# Patient Record
Sex: MALE | Race: WHITE | URBAN - METROPOLITAN AREA
[De-identification: names, ages, dates, MRNs, and addresses within clinical notes are randomized per-mention and may not be internally consistent; named-entity substitution may affect disease eponyms.]

---

## 2017-05-17 ENCOUNTER — OFFICE VISIT (OUTPATIENT)
Dept: FAMILY MEDICINE CLINIC | Age: 62
End: 2017-05-17

## 2017-05-17 VITALS
TEMPERATURE: 98.1 F | HEART RATE: 65 BPM | DIASTOLIC BLOOD PRESSURE: 88 MMHG | OXYGEN SATURATION: 95 % | RESPIRATION RATE: 18 BRPM | BODY MASS INDEX: 38.57 KG/M2 | WEIGHT: 291 LBS | SYSTOLIC BLOOD PRESSURE: 140 MMHG | HEIGHT: 73 IN

## 2017-05-17 DIAGNOSIS — Z12.5 SCREENING FOR PROSTATE CANCER: ICD-10-CM

## 2017-05-17 DIAGNOSIS — E78.2 MIXED HYPERLIPIDEMIA: ICD-10-CM

## 2017-05-17 DIAGNOSIS — Z11.59 NEED FOR HEPATITIS C SCREENING TEST: ICD-10-CM

## 2017-05-17 DIAGNOSIS — Z00.00 PHYSICAL EXAM: Primary | ICD-10-CM

## 2017-05-17 DIAGNOSIS — E03.9 ACQUIRED HYPOTHYROIDISM: ICD-10-CM

## 2017-05-17 DIAGNOSIS — I10 ESSENTIAL HYPERTENSION: ICD-10-CM

## 2017-05-17 PROCEDURE — 99396 PREV VISIT EST AGE 40-64: CPT | Performed by: NURSE PRACTITIONER

## 2017-05-17 RX ORDER — HYDROCHLOROTHIAZIDE 12.5 MG/1
12.5 TABLET ORAL DAILY
Qty: 30 TABLET | Refills: 0 | Status: SHIPPED | OUTPATIENT
Start: 2017-05-17 | End: 2017-06-24 | Stop reason: SDUPTHER

## 2017-05-17 ASSESSMENT — ENCOUNTER SYMPTOMS
RESPIRATORY NEGATIVE: 1
EYES NEGATIVE: 1
GASTROINTESTINAL NEGATIVE: 1

## 2017-05-17 ASSESSMENT — PATIENT HEALTH QUESTIONNAIRE - PHQ9
SUM OF ALL RESPONSES TO PHQ9 QUESTIONS 1 & 2: 0
2. FEELING DOWN, DEPRESSED OR HOPELESS: 0
1. LITTLE INTEREST OR PLEASURE IN DOING THINGS: 0
SUM OF ALL RESPONSES TO PHQ QUESTIONS 1-9: 0

## 2017-05-28 DIAGNOSIS — M15.9 PRIMARY OSTEOARTHRITIS INVOLVING MULTIPLE JOINTS: Primary | ICD-10-CM

## 2017-05-28 RX ORDER — MELOXICAM 15 MG/1
TABLET ORAL
Qty: 30 TABLET | Refills: 11 | OUTPATIENT
Start: 2017-05-28

## 2017-06-01 RX ORDER — MELOXICAM 15 MG/1
15 TABLET ORAL DAILY
Qty: 30 TABLET | Refills: 11 | Status: SHIPPED | OUTPATIENT
Start: 2017-06-01

## 2017-08-21 RX ORDER — HYDROCHLOROTHIAZIDE 12.5 MG/1
12.5 CAPSULE, GELATIN COATED ORAL EVERY MORNING
Qty: 90 CAPSULE | Refills: 3 | Status: SHIPPED | OUTPATIENT
Start: 2017-08-21

## 2017-09-01 DIAGNOSIS — E78.2 MIXED HYPERLIPIDEMIA: ICD-10-CM

## 2017-09-01 DIAGNOSIS — Z12.5 SCREENING FOR PROSTATE CANCER: ICD-10-CM

## 2017-09-01 DIAGNOSIS — E03.9 ACQUIRED HYPOTHYROIDISM: ICD-10-CM

## 2017-09-01 DIAGNOSIS — Z11.59 NEED FOR HEPATITIS C SCREENING TEST: ICD-10-CM

## 2017-09-01 DIAGNOSIS — I10 ESSENTIAL HYPERTENSION: ICD-10-CM

## 2017-09-01 LAB
ALBUMIN SERPL-MCNC: 3.9 G/DL (ref 3.9–4.9)
ALP BLD-CCNC: 65 U/L (ref 35–104)
ALT SERPL-CCNC: 22 U/L (ref 0–41)
ANION GAP SERPL CALCULATED.3IONS-SCNC: 14 MEQ/L (ref 7–13)
AST SERPL-CCNC: 22 U/L (ref 0–40)
BASOPHILS ABSOLUTE: 0.1 K/UL (ref 0–0.2)
BASOPHILS RELATIVE PERCENT: 1 %
BILIRUB SERPL-MCNC: 0.6 MG/DL (ref 0–1.2)
BUN BLDV-MCNC: 23 MG/DL (ref 8–23)
CALCIUM SERPL-MCNC: 8.9 MG/DL (ref 8.6–10.2)
CHLORIDE BLD-SCNC: 101 MEQ/L (ref 98–107)
CHOLESTEROL, TOTAL: 228 MG/DL (ref 0–199)
CO2: 26 MEQ/L (ref 22–29)
CREAT SERPL-MCNC: 0.94 MG/DL (ref 0.7–1.2)
EOSINOPHILS ABSOLUTE: 0.3 K/UL (ref 0–0.7)
EOSINOPHILS RELATIVE PERCENT: 5.9 %
GFR AFRICAN AMERICAN: >60
GFR NON-AFRICAN AMERICAN: >60
GLOBULIN: 2.5 G/DL (ref 2.3–3.5)
GLUCOSE BLD-MCNC: 99 MG/DL (ref 74–109)
HCT VFR BLD CALC: 44 % (ref 42–52)
HDLC SERPL-MCNC: 53 MG/DL (ref 40–59)
HEMOGLOBIN: 14.7 G/DL (ref 14–18)
HEPATITIS C ANTIBODY INTERPRETATION: NORMAL
LDL CHOLESTEROL CALCULATED: 150 MG/DL (ref 0–129)
LYMPHOCYTES ABSOLUTE: 1.3 K/UL (ref 1–4.8)
LYMPHOCYTES RELATIVE PERCENT: 25.1 %
MCH RBC QN AUTO: 30.4 PG (ref 27–31.3)
MCHC RBC AUTO-ENTMCNC: 33.3 % (ref 33–37)
MCV RBC AUTO: 91.3 FL (ref 80–100)
MONOCYTES ABSOLUTE: 0.5 K/UL (ref 0.2–0.8)
MONOCYTES RELATIVE PERCENT: 8.7 %
NEUTROPHILS ABSOLUTE: 3.1 K/UL (ref 1.4–6.5)
NEUTROPHILS RELATIVE PERCENT: 59.3 %
PDW BLD-RTO: 13.4 % (ref 11.5–14.5)
PLATELET # BLD: 209 K/UL (ref 130–400)
POTASSIUM SERPL-SCNC: 4.9 MEQ/L (ref 3.5–5.1)
PROSTATE SPECIFIC ANTIGEN: 3.6 NG/ML (ref 0–5.4)
RBC # BLD: 4.82 M/UL (ref 4.7–6.1)
SODIUM BLD-SCNC: 141 MEQ/L (ref 132–144)
TOTAL PROTEIN: 6.4 G/DL (ref 6.4–8.1)
TRIGL SERPL-MCNC: 123 MG/DL (ref 0–200)
TSH REFLEX: 2.94 UIU/ML (ref 0.27–4.2)
WBC # BLD: 5.2 K/UL (ref 4.8–10.8)

## 2018-04-07 DIAGNOSIS — E03.9 ACQUIRED HYPOTHYROIDISM: Primary | ICD-10-CM

## 2018-04-07 DIAGNOSIS — E03.9 ACQUIRED HYPOTHYROIDISM: ICD-10-CM

## 2018-04-07 LAB — TSH SERPL DL<=0.05 MIU/L-ACNC: 3.58 UIU/ML (ref 0.27–4.2)

## 2018-04-09 RX ORDER — LEVOTHYROXINE SODIUM 0.05 MG/1
TABLET ORAL
Qty: 90 TABLET | Refills: 3 | Status: SHIPPED | OUTPATIENT
Start: 2018-04-09

## 2023-01-29 PROBLEM — J34.89 NASAL CONGESTION WITH RHINORRHEA: Status: ACTIVE | Noted: 2023-01-29

## 2023-01-29 PROBLEM — J00 NASOPHARYNGITIS: Status: ACTIVE | Noted: 2023-01-29

## 2023-01-29 PROBLEM — E66.812 CLASS 2 SEVERE OBESITY WITH SERIOUS COMORBIDITY AND BODY MASS INDEX (BMI) OF 38.0 TO 38.9 IN ADULT: Status: ACTIVE | Noted: 2023-01-29

## 2023-01-29 PROBLEM — R09.81 NASAL CONGESTION WITH RHINORRHEA: Status: ACTIVE | Noted: 2023-01-29

## 2023-01-29 PROBLEM — R97.20 ELEVATED PSA: Status: ACTIVE | Noted: 2023-01-29

## 2023-01-29 PROBLEM — E03.9 ACQUIRED HYPOTHYROIDISM: Status: ACTIVE | Noted: 2023-01-29

## 2023-01-29 PROBLEM — N13.9 OBSTRUCTIVE UROPATHY: Status: ACTIVE | Noted: 2023-01-29

## 2023-01-29 PROBLEM — H10.32 ACUTE BACTERIAL CONJUNCTIVITIS OF LEFT EYE: Status: ACTIVE | Noted: 2023-01-29

## 2023-01-29 PROBLEM — N40.2 PROSTATE NODULE: Status: ACTIVE | Noted: 2023-01-29

## 2023-01-29 PROBLEM — E66.01 CLASS 2 SEVERE OBESITY WITH SERIOUS COMORBIDITY AND BODY MASS INDEX (BMI) OF 38.0 TO 38.9 IN ADULT (MULTI): Status: ACTIVE | Noted: 2023-01-29

## 2023-01-29 PROBLEM — H57.89 REDNESS AND DISCHARGE OF EYE: Status: ACTIVE | Noted: 2023-01-29

## 2023-01-29 PROBLEM — H69.90 EUSTACHIAN TUBE DYSFUNCTION: Status: RESOLVED | Noted: 2023-01-29 | Resolved: 2023-01-29

## 2023-01-29 PROBLEM — R05.9 COUGH: Status: ACTIVE | Noted: 2023-01-29

## 2023-01-29 PROBLEM — E78.00 HYPERCHOLESTEREMIA: Status: ACTIVE | Noted: 2023-01-29

## 2023-01-29 RX ORDER — CEFDINIR 300 MG/1
300 CAPSULE ORAL EVERY 12 HOURS
COMMUNITY
End: 2023-03-13 | Stop reason: ALTCHOICE

## 2023-01-29 RX ORDER — TAMSULOSIN HYDROCHLORIDE 0.4 MG/1
0.4 CAPSULE ORAL DAILY
COMMUNITY

## 2023-01-29 RX ORDER — ATORVASTATIN CALCIUM 20 MG/1
20 TABLET, FILM COATED ORAL DAILY
COMMUNITY
Start: 2020-02-20 | End: 2023-09-05

## 2023-01-29 RX ORDER — TOBRAMYCIN 3 MG/ML
1 SOLUTION/ DROPS OPHTHALMIC 4 TIMES DAILY
COMMUNITY
End: 2023-03-13 | Stop reason: WASHOUT

## 2023-01-29 RX ORDER — LEVOTHYROXINE SODIUM 50 UG/1
50 TABLET ORAL DAILY
COMMUNITY
Start: 2019-07-15 | End: 2023-05-01 | Stop reason: SDUPTHER

## 2023-03-07 ENCOUNTER — LAB (OUTPATIENT)
Dept: LAB | Facility: LAB | Age: 68
End: 2023-03-07
Payer: MEDICARE

## 2023-03-07 DIAGNOSIS — R97.20 ELEVATED PSA: ICD-10-CM

## 2023-03-07 DIAGNOSIS — E03.9 ACQUIRED HYPOTHYROIDISM: ICD-10-CM

## 2023-03-07 DIAGNOSIS — E78.00 HYPERCHOLESTEREMIA: ICD-10-CM

## 2023-03-07 LAB
ALANINE AMINOTRANSFERASE (SGPT) (U/L) IN SER/PLAS: 21 U/L (ref 10–52)
ALBUMIN (G/DL) IN SER/PLAS: 3.9 G/DL (ref 3.4–5)
ALKALINE PHOSPHATASE (U/L) IN SER/PLAS: 61 U/L (ref 33–136)
ANION GAP IN SER/PLAS: 12 MMOL/L (ref 10–20)
ASPARTATE AMINOTRANSFERASE (SGOT) (U/L) IN SER/PLAS: 24 U/L (ref 9–39)
BASOPHILS (10*3/UL) IN BLOOD BY AUTOMATED COUNT: 0.07 X10E9/L (ref 0–0.1)
BASOPHILS/100 LEUKOCYTES IN BLOOD BY AUTOMATED COUNT: 1.9 % (ref 0–2)
BILIRUBIN TOTAL (MG/DL) IN SER/PLAS: 1 MG/DL (ref 0–1.2)
CALCIUM (MG/DL) IN SER/PLAS: 8.9 MG/DL (ref 8.6–10.3)
CARBON DIOXIDE, TOTAL (MMOL/L) IN SER/PLAS: 29 MMOL/L (ref 21–32)
CHLORIDE (MMOL/L) IN SER/PLAS: 101 MMOL/L (ref 98–107)
CHOLESTEROL (MG/DL) IN SER/PLAS: 198 MG/DL (ref 0–199)
CHOLESTEROL IN HDL (MG/DL) IN SER/PLAS: 66.1 MG/DL
CHOLESTEROL/HDL RATIO: 3
CREATININE (MG/DL) IN SER/PLAS: 1.05 MG/DL (ref 0.5–1.3)
EOSINOPHILS (10*3/UL) IN BLOOD BY AUTOMATED COUNT: 0.2 X10E9/L (ref 0–0.7)
EOSINOPHILS/100 LEUKOCYTES IN BLOOD BY AUTOMATED COUNT: 5.4 % (ref 0–6)
ERYTHROCYTE DISTRIBUTION WIDTH (RATIO) BY AUTOMATED COUNT: 13.2 % (ref 11.5–14.5)
ERYTHROCYTE MEAN CORPUSCULAR HEMOGLOBIN CONCENTRATION (G/DL) BY AUTOMATED: 32.6 G/DL (ref 32–36)
ERYTHROCYTE MEAN CORPUSCULAR VOLUME (FL) BY AUTOMATED COUNT: 94 FL (ref 80–100)
ERYTHROCYTES (10*6/UL) IN BLOOD BY AUTOMATED COUNT: 4.86 X10E12/L (ref 4.5–5.9)
GFR MALE: 77 ML/MIN/1.73M2
GLUCOSE (MG/DL) IN SER/PLAS: 115 MG/DL (ref 74–99)
HEMATOCRIT (%) IN BLOOD BY AUTOMATED COUNT: 45.7 % (ref 41–52)
HEMOGLOBIN (G/DL) IN BLOOD: 14.9 G/DL (ref 13.5–17.5)
IMMATURE GRANULOCYTES/100 LEUKOCYTES IN BLOOD BY AUTOMATED COUNT: 0.3 % (ref 0–0.9)
LDL: 102 MG/DL (ref 0–99)
LEUKOCYTES (10*3/UL) IN BLOOD BY AUTOMATED COUNT: 3.7 X10E9/L (ref 4.4–11.3)
LYMPHOCYTES (10*3/UL) IN BLOOD BY AUTOMATED COUNT: 0.93 X10E9/L (ref 1.2–4.8)
LYMPHOCYTES/100 LEUKOCYTES IN BLOOD BY AUTOMATED COUNT: 25.3 % (ref 13–44)
MONOCYTES (10*3/UL) IN BLOOD BY AUTOMATED COUNT: 0.34 X10E9/L (ref 0.1–1)
MONOCYTES/100 LEUKOCYTES IN BLOOD BY AUTOMATED COUNT: 9.2 % (ref 2–10)
NEUTROPHILS (10*3/UL) IN BLOOD BY AUTOMATED COUNT: 2.13 X10E9/L (ref 1.2–7.7)
NEUTROPHILS/100 LEUKOCYTES IN BLOOD BY AUTOMATED COUNT: 57.9 % (ref 40–80)
PLATELETS (10*3/UL) IN BLOOD AUTOMATED COUNT: 207 X10E9/L (ref 150–450)
POTASSIUM (MMOL/L) IN SER/PLAS: 4.2 MMOL/L (ref 3.5–5.3)
PROSTATE SPECIFIC ANTIGEN,SCREEN: 6.79 NG/ML (ref 0–4)
PROTEIN TOTAL: 6.3 G/DL (ref 6.4–8.2)
SODIUM (MMOL/L) IN SER/PLAS: 138 MMOL/L (ref 136–145)
THYROTROPIN (MIU/L) IN SER/PLAS BY DETECTION LIMIT <= 0.05 MIU/L: 5.29 MIU/L (ref 0.44–3.98)
TRIGLYCERIDE (MG/DL) IN SER/PLAS: 150 MG/DL (ref 0–149)
UREA NITROGEN (MG/DL) IN SER/PLAS: 18 MG/DL (ref 6–23)
VLDL: 30 MG/DL (ref 0–40)

## 2023-03-07 PROCEDURE — 85025 COMPLETE CBC W/AUTO DIFF WBC: CPT

## 2023-03-07 PROCEDURE — 84443 ASSAY THYROID STIM HORMONE: CPT

## 2023-03-07 PROCEDURE — 80061 LIPID PANEL: CPT

## 2023-03-07 PROCEDURE — 80053 COMPREHEN METABOLIC PANEL: CPT

## 2023-03-07 PROCEDURE — 84153 ASSAY OF PSA TOTAL: CPT

## 2023-03-07 PROCEDURE — 36415 COLL VENOUS BLD VENIPUNCTURE: CPT

## 2023-03-13 ENCOUNTER — OFFICE VISIT (OUTPATIENT)
Dept: PRIMARY CARE | Facility: CLINIC | Age: 68
End: 2023-03-13
Payer: MEDICARE

## 2023-03-13 VITALS
HEART RATE: 66 BPM | SYSTOLIC BLOOD PRESSURE: 122 MMHG | HEIGHT: 74 IN | DIASTOLIC BLOOD PRESSURE: 66 MMHG | WEIGHT: 288.8 LBS | OXYGEN SATURATION: 97 % | BODY MASS INDEX: 37.06 KG/M2 | TEMPERATURE: 96.6 F | RESPIRATION RATE: 14 BRPM

## 2023-03-13 DIAGNOSIS — R97.20 ELEVATED PSA: ICD-10-CM

## 2023-03-13 DIAGNOSIS — E03.9 ACQUIRED HYPOTHYROIDISM: ICD-10-CM

## 2023-03-13 DIAGNOSIS — Z00.00 ROUTINE GENERAL MEDICAL EXAMINATION AT HEALTH CARE FACILITY: Primary | ICD-10-CM

## 2023-03-13 PROBLEM — R09.81 NASAL CONGESTION WITH RHINORRHEA: Status: RESOLVED | Noted: 2023-01-29 | Resolved: 2023-03-13

## 2023-03-13 PROBLEM — H57.89 REDNESS AND DISCHARGE OF EYE: Status: RESOLVED | Noted: 2023-01-29 | Resolved: 2023-03-13

## 2023-03-13 PROBLEM — R05.9 COUGH: Status: RESOLVED | Noted: 2023-01-29 | Resolved: 2023-03-13

## 2023-03-13 PROBLEM — J00 NASOPHARYNGITIS: Status: RESOLVED | Noted: 2023-01-29 | Resolved: 2023-03-13

## 2023-03-13 PROBLEM — J34.89 NASAL CONGESTION WITH RHINORRHEA: Status: RESOLVED | Noted: 2023-01-29 | Resolved: 2023-03-13

## 2023-03-13 PROBLEM — H10.32 ACUTE BACTERIAL CONJUNCTIVITIS OF LEFT EYE: Status: RESOLVED | Noted: 2023-01-29 | Resolved: 2023-03-13

## 2023-03-13 PROCEDURE — G0439 PPPS, SUBSEQ VISIT: HCPCS | Performed by: FAMILY MEDICINE

## 2023-03-13 PROCEDURE — 1159F MED LIST DOCD IN RCRD: CPT | Performed by: FAMILY MEDICINE

## 2023-03-13 PROCEDURE — 99214 OFFICE O/P EST MOD 30 MIN: CPT | Performed by: FAMILY MEDICINE

## 2023-03-13 PROCEDURE — 1036F TOBACCO NON-USER: CPT | Performed by: FAMILY MEDICINE

## 2023-03-13 PROCEDURE — 1170F FXNL STATUS ASSESSED: CPT | Performed by: FAMILY MEDICINE

## 2023-03-13 PROCEDURE — 1160F RVW MEDS BY RX/DR IN RCRD: CPT | Performed by: FAMILY MEDICINE

## 2023-03-13 ASSESSMENT — ENCOUNTER SYMPTOMS
OCCASIONAL FEELINGS OF UNSTEADINESS: 0
DEPRESSION: 0
LOSS OF SENSATION IN FEET: 0

## 2023-03-13 ASSESSMENT — ACTIVITIES OF DAILY LIVING (ADL)
DRESSING: INDEPENDENT
BATHING: INDEPENDENT
MANAGING_FINANCES: INDEPENDENT
TAKING_MEDICATION: INDEPENDENT
DOING_HOUSEWORK: INDEPENDENT
GROCERY_SHOPPING: INDEPENDENT

## 2023-03-13 ASSESSMENT — PATIENT HEALTH QUESTIONNAIRE - PHQ9
2. FEELING DOWN, DEPRESSED OR HOPELESS: NOT AT ALL
1. LITTLE INTEREST OR PLEASURE IN DOING THINGS: NOT AT ALL
SUM OF ALL RESPONSES TO PHQ9 QUESTIONS 1 AND 2: 0

## 2023-03-13 NOTE — PATIENT INSTRUCTIONS
Cancel May appointment.  This patient will follow-up with me in September with lab work ahead of time. Other follow-up will be as needed.

## 2023-03-13 NOTE — PROGRESS NOTES
"  Subjective   Reason for Visit: Sklyer Pena is an 67 y.o. male here for a Medicare Wellness visit.     Reviewed all medications by prescribing practitioner or clinical pharmacist (such as prescriptions, OTCs, herbal therapies and supplements) and documented in the medical record.    Patient Self Assessment of Health Status  Patient Self Assessment: GoodHyperlipidemia: Reports taking medication as advised and without side effects.  The patient is reporting no leg cramping in particular. The patient is trying to follow a low-fat diet.  Hypothyroidism: This patient is here today for review of hypothyroidism. Lab work is reviewed. The patient is taking thyroid replacement hormone without side effects.   Obstructive Uropathy: Patient states symptoms are well controlled with his medication.    Patient Care Team:  Fernando Caban MD as PCP - General  Fernando Caban MD as PCP - [employer] PCP     This patient has no chest pains or pressures. The patient has no shortness of breath at rest or with exertion. There is no indigestion, heartburn, nausea or vomiting. There is no constipation, diarrhea or stool changes.    Objective   Vitals:  /66   Pulse 66   Temp 35.9 °C (96.6 °F)   Resp 14   Ht 1.88 m (6' 2\")   Wt 131 kg (288 lb 12.8 oz)   SpO2 97%   BMI 37.08 kg/m²       Physical Exam patient is alert and oriented X 3.  Neck is supple without mass, adenopathy or bruit.  Lungs are clear with equal breath sounds.  Heart is in a regular rhythm without murmur rub or gallop.  Abdomen is soft and nontender with no masses palpable. This patient has an elevated BMI.  Low back exam significant for no tenderness in the lumbosacral spine.  Extremity exam significant for palpable pulses at the posterior tibials and radials which are strong and symmetric. There is no edema in the extremities upper or lower.    CBC, CMP, lipid panel, TSH and PSA from 3/7/2023 reviewed.  Patient recently had prostate biopsy " that was benign.    This patient will follow-up with me in September with lab work ahead of time. Other follow-up will be as needed.    Assessment/Plan   Problem List Items Addressed This Visit          Genitourinary    Elevated PSA    Current Assessment & Plan     PSA is elevated but patient recently had transrectal prostate biopsy which showed no evidence of cancer.            Endocrine/Metabolic    Acquired hypothyroidism    Current Assessment & Plan      Is poorly controlled, therapeutic changes necessary.  Patient will become more diligent about taking the current dose of levothyroxine on a regular basis.         Relevant Orders    Follow Up In [facility] - PCP       Other    Routine general medical examination at health care facility - Primary    Overview     Medicare wellness visit done, patient is in satisfactory living circumstances where the patient's needs are met.

## 2023-03-14 NOTE — ASSESSMENT & PLAN NOTE
Is poorly controlled, therapeutic changes necessary.  Patient will become more diligent about taking the current dose of levothyroxine on a regular basis.

## 2023-03-14 NOTE — ASSESSMENT & PLAN NOTE
PSA is elevated but patient recently had transrectal prostate biopsy which showed no evidence of cancer.

## 2023-03-14 NOTE — ASSESSMENT & PLAN NOTE
This patient is advised to lose weight by reducing calorie intake and increasing activity levels, especially aerobic exercise.

## 2023-04-22 ENCOUNTER — OFFICE VISIT (OUTPATIENT)
Dept: PRIMARY CARE | Facility: CLINIC | Age: 68
End: 2023-04-22
Payer: MEDICARE

## 2023-04-22 VITALS
HEART RATE: 75 BPM | TEMPERATURE: 97.3 F | SYSTOLIC BLOOD PRESSURE: 168 MMHG | HEIGHT: 74 IN | OXYGEN SATURATION: 95 % | BODY MASS INDEX: 36.7 KG/M2 | RESPIRATION RATE: 16 BRPM | DIASTOLIC BLOOD PRESSURE: 90 MMHG | WEIGHT: 286 LBS

## 2023-04-22 DIAGNOSIS — H10.31 ACUTE BACTERIAL CONJUNCTIVITIS OF RIGHT EYE: Primary | ICD-10-CM

## 2023-04-22 DIAGNOSIS — R05.8 PRODUCTIVE COUGH: ICD-10-CM

## 2023-04-22 DIAGNOSIS — E03.9 ACQUIRED HYPOTHYROIDISM: ICD-10-CM

## 2023-04-22 PROCEDURE — 1159F MED LIST DOCD IN RCRD: CPT | Performed by: NURSE PRACTITIONER

## 2023-04-22 PROCEDURE — 1160F RVW MEDS BY RX/DR IN RCRD: CPT | Performed by: NURSE PRACTITIONER

## 2023-04-22 PROCEDURE — 99213 OFFICE O/P EST LOW 20 MIN: CPT | Performed by: NURSE PRACTITIONER

## 2023-04-22 PROCEDURE — 1036F TOBACCO NON-USER: CPT | Performed by: NURSE PRACTITIONER

## 2023-04-22 RX ORDER — AZITHROMYCIN 250 MG/1
TABLET, FILM COATED ORAL
Qty: 6 TABLET | Refills: 0 | Status: SHIPPED | OUTPATIENT
Start: 2023-04-22 | End: 2023-09-11 | Stop reason: WASHOUT

## 2023-04-22 RX ORDER — POLYMYXIN B SULFATE AND TRIMETHOPRIM 1; 10000 MG/ML; [USP'U]/ML
1 SOLUTION OPHTHALMIC 4 TIMES DAILY
Qty: 10 ML | Refills: 0 | Status: SHIPPED | OUTPATIENT
Start: 2023-04-22 | End: 2023-04-29

## 2023-04-22 ASSESSMENT — PATIENT HEALTH QUESTIONNAIRE - PHQ9
SUM OF ALL RESPONSES TO PHQ9 QUESTIONS 1 AND 2: 0
1. LITTLE INTEREST OR PLEASURE IN DOING THINGS: NOT AT ALL
2. FEELING DOWN, DEPRESSED OR HOPELESS: NOT AT ALL

## 2023-04-22 ASSESSMENT — ENCOUNTER SYMPTOMS
EYE ITCHING: 1
STRIDOR: 0
EYE PAIN: 0
COUGH: 1
DEPRESSION: 0
MUSCULOSKELETAL NEGATIVE: 1
CONSTITUTIONAL NEGATIVE: 1
CARDIOVASCULAR NEGATIVE: 1
EYE REDNESS: 1
SWOLLEN GLANDS: 0
FEVER: 0
DOUBLE VISION: 0
NEUROLOGICAL NEGATIVE: 1
PSYCHIATRIC NEGATIVE: 1
EYE DISCHARGE: 1
SORE THROAT: 0
RHINORRHEA: 0
DIARRHEA: 0
SHORTNESS OF BREATH: 0
WHEEZING: 0
HEADACHES: 0
VOMITING: 0
ENDOCRINE NEGATIVE: 1
ALLERGIC/IMMUNOLOGIC NEGATIVE: 1
GASTROINTESTINAL NEGATIVE: 1
NECK PAIN: 0
OCCASIONAL FEELINGS OF UNSTEADINESS: 0
HEMATOLOGIC/LYMPHATIC NEGATIVE: 1
PHOTOPHOBIA: 0
LOSS OF SENSATION IN FEET: 0
NAUSEA: 0
CONSTIPATION: 0
CHEST TIGHTNESS: 0

## 2023-04-22 NOTE — PROGRESS NOTES
Chief Complaint: Possible conjunctivitis   Symptoms: right eye redness, cough, congestion, green phlegm, right eye drainage, and crusted over   Length of symptoms: 04/22/2023 but for cough 2+ weeks  OTC: eye drops  Related information:

## 2023-04-22 NOTE — ASSESSMENT & PLAN NOTE
Patient to take medication associated with this visit.  Patient may also take OTC analgesic/antipyretic if needed for pain/fever.  Advised to increase oral fluid intake as tolerated if no nausea or vomiting. Patient will use humidifier as discussed.

## 2023-04-22 NOTE — ASSESSMENT & PLAN NOTE
Patient to use medication associated with this visit.  Patient advised to increase frequency of washing hands. Patient can use warm compress to assist with crusting  of eye.

## 2023-04-22 NOTE — PROGRESS NOTES
Subjective   Patient ID: Skyler Pena is a 67 y.o. male who presents for Conjunctivitis.  Patient presents to office with complaint of right eye crusting, itchy, redness x 2 days ago.  Patient also reports productive cough x 2 weeks.  Patient has tried OTC eye drops with minimal relief.  Patient denies change in vision, fever, eye pain.  Patient reports OTC cough syrup and honey with minimal relief.  Patient denies shortness of breath, chest pain, nausea, vomiting nor diarrhea. Patient has been eating and drinking without difficulty.     Conjunctivitis   The current episode started today. The onset was sudden. The problem has been unchanged. Nothing relieves the symptoms. Associated symptoms include eye itching, congestion, cough, eye discharge and eye redness. Pertinent negatives include no fever, no decreased vision, no double vision, no photophobia, no constipation, no diarrhea, no nausea, no vomiting, no ear discharge, no ear pain, no headaches, no hearing loss, no mouth sores, no rhinorrhea, no sore throat, no stridor, no swollen glands, no neck pain, no neck stiffness, no wheezing, no rash and no eye pain. The right eye is affected. He has been Eating and drinking normally.       Review of Systems   Constitutional: Negative.  Negative for fever.   HENT:  Positive for congestion. Negative for ear discharge, ear pain, hearing loss, mouth sores, rhinorrhea and sore throat.    Eyes:  Positive for discharge, redness and itching. Negative for double vision, photophobia and pain.   Respiratory:  Positive for cough. Negative for chest tightness, shortness of breath, wheezing and stridor.    Cardiovascular: Negative.  Negative for chest pain.   Gastrointestinal: Negative.  Negative for constipation, diarrhea, nausea and vomiting.   Endocrine: Negative.    Genitourinary: Negative.    Musculoskeletal: Negative.  Negative for neck pain.   Skin: Negative.  Negative for rash.   Allergic/Immunologic: Negative.   "  Neurological: Negative.  Negative for headaches.   Hematological: Negative.    Psychiatric/Behavioral: Negative.     All other systems reviewed and are negative.      /90   Pulse 75   Temp 36.3 °C (97.3 °F)   Resp 16   Ht 1.88 m (6' 2\")   Wt 130 kg (286 lb)   SpO2 95%   BMI 36.72 kg/m²     Objective   Physical Exam  Vitals and nursing note reviewed.   Constitutional:       Appearance: Normal appearance.   HENT:      Head: Normocephalic and atraumatic.      Right Ear: Tympanic membrane, ear canal and external ear normal.      Left Ear: Tympanic membrane, ear canal and external ear normal.      Nose: Nose normal.      Mouth/Throat:      Mouth: Mucous membranes are moist.      Pharynx: Oropharynx is clear.   Eyes:      General:         Right eye: Discharge present.      Extraocular Movements: Extraocular movements intact.      Pupils: Pupils are equal, round, and reactive to light.      Comments: Right eye moderate redness noted.    Cardiovascular:      Rate and Rhythm: Normal rate and regular rhythm.      Pulses: Normal pulses.      Heart sounds: Normal heart sounds.   Pulmonary:      Effort: Pulmonary effort is normal. No respiratory distress.      Breath sounds: Normal breath sounds. No stridor. No wheezing, rhonchi or rales.   Chest:      Chest wall: No tenderness.   Musculoskeletal:         General: Normal range of motion.      Cervical back: Normal range of motion and neck supple.   Skin:     General: Skin is warm and dry.      Capillary Refill: Capillary refill takes less than 2 seconds.   Neurological:      General: No focal deficit present.      Mental Status: He is alert and oriented to person, place, and time.   Psychiatric:         Mood and Affect: Mood normal.         Behavior: Behavior normal.         Thought Content: Thought content normal.         Judgment: Judgment normal.         Assessment/Plan   Problem List Items Addressed This Visit    None         "

## 2023-05-01 DIAGNOSIS — E03.9 ACQUIRED HYPOTHYROIDISM: ICD-10-CM

## 2023-05-01 RX ORDER — LEVOTHYROXINE SODIUM 50 UG/1
50 TABLET ORAL DAILY
Qty: 30 TABLET | Refills: 5 | Status: SHIPPED | OUTPATIENT
Start: 2023-05-01 | End: 2023-11-22

## 2023-05-01 NOTE — TELEPHONE ENCOUNTER
Rx Refill Request Telephone Encounter    Name:  Skyler Pena  :  [id number]  Medication Name:  Levothyroxine 50 mcg  Specific Pharmacy location:  [facility]  Date of last appointment:    Date of next appointment:  5/15  Best number to reach patient:  [phone]

## 2023-08-11 LAB
ALANINE AMINOTRANSFERASE (SGPT) (U/L) IN SER/PLAS: 20 U/L (ref 10–52)
ALBUMIN (G/DL) IN SER/PLAS: 3.9 G/DL (ref 3.4–5)
ALKALINE PHOSPHATASE (U/L) IN SER/PLAS: 73 U/L (ref 33–136)
ANION GAP IN SER/PLAS: 12 MMOL/L (ref 10–20)
ASPARTATE AMINOTRANSFERASE (SGOT) (U/L) IN SER/PLAS: 20 U/L (ref 9–39)
BASOPHILS (10*3/UL) IN BLOOD BY AUTOMATED COUNT: 0.05 X10E9/L (ref 0–0.1)
BASOPHILS/100 LEUKOCYTES IN BLOOD BY AUTOMATED COUNT: 1.1 % (ref 0–2)
BILIRUBIN TOTAL (MG/DL) IN SER/PLAS: 0.6 MG/DL (ref 0–1.2)
CALCIUM (MG/DL) IN SER/PLAS: 8.4 MG/DL (ref 8.6–10.3)
CARBON DIOXIDE, TOTAL (MMOL/L) IN SER/PLAS: 25 MMOL/L (ref 21–32)
CHLORIDE (MMOL/L) IN SER/PLAS: 103 MMOL/L (ref 98–107)
CHOLESTEROL (MG/DL) IN SER/PLAS: 182 MG/DL (ref 0–199)
CHOLESTEROL IN HDL (MG/DL) IN SER/PLAS: 57.7 MG/DL
CHOLESTEROL/HDL RATIO: 3.2
CREATININE (MG/DL) IN SER/PLAS: 1.06 MG/DL (ref 0.5–1.3)
EOSINOPHILS (10*3/UL) IN BLOOD BY AUTOMATED COUNT: 0.24 X10E9/L (ref 0–0.7)
EOSINOPHILS/100 LEUKOCYTES IN BLOOD BY AUTOMATED COUNT: 5.3 % (ref 0–6)
ERYTHROCYTE DISTRIBUTION WIDTH (RATIO) BY AUTOMATED COUNT: 13.2 % (ref 11.5–14.5)
ERYTHROCYTE MEAN CORPUSCULAR HEMOGLOBIN CONCENTRATION (G/DL) BY AUTOMATED: 33 G/DL (ref 32–36)
ERYTHROCYTE MEAN CORPUSCULAR VOLUME (FL) BY AUTOMATED COUNT: 94 FL (ref 80–100)
ERYTHROCYTES (10*6/UL) IN BLOOD BY AUTOMATED COUNT: 4.69 X10E12/L (ref 4.5–5.9)
GFR MALE: 76 ML/MIN/1.73M2
GLUCOSE (MG/DL) IN SER/PLAS: 107 MG/DL (ref 74–99)
HEMATOCRIT (%) IN BLOOD BY AUTOMATED COUNT: 43.9 % (ref 41–52)
HEMOGLOBIN (G/DL) IN BLOOD: 14.5 G/DL (ref 13.5–17.5)
IMMATURE GRANULOCYTES/100 LEUKOCYTES IN BLOOD BY AUTOMATED COUNT: 0.4 % (ref 0–0.9)
LDL: 105 MG/DL (ref 0–99)
LEUKOCYTES (10*3/UL) IN BLOOD BY AUTOMATED COUNT: 4.5 X10E9/L (ref 4.4–11.3)
LYMPHOCYTES (10*3/UL) IN BLOOD BY AUTOMATED COUNT: 1.04 X10E9/L (ref 1.2–4.8)
LYMPHOCYTES/100 LEUKOCYTES IN BLOOD BY AUTOMATED COUNT: 23.1 % (ref 13–44)
MONOCYTES (10*3/UL) IN BLOOD BY AUTOMATED COUNT: 0.34 X10E9/L (ref 0.1–1)
MONOCYTES/100 LEUKOCYTES IN BLOOD BY AUTOMATED COUNT: 7.6 % (ref 2–10)
NEUTROPHILS (10*3/UL) IN BLOOD BY AUTOMATED COUNT: 2.81 X10E9/L (ref 1.2–7.7)
NEUTROPHILS/100 LEUKOCYTES IN BLOOD BY AUTOMATED COUNT: 62.5 % (ref 40–80)
PLATELETS (10*3/UL) IN BLOOD AUTOMATED COUNT: 233 X10E9/L (ref 150–450)
POTASSIUM (MMOL/L) IN SER/PLAS: 4.1 MMOL/L (ref 3.5–5.3)
PROSTATE SPECIFIC AG (NG/ML) IN SER/PLAS: 5.91 NG/ML (ref 0–4)
PROTEIN TOTAL: 6.5 G/DL (ref 6.4–8.2)
SODIUM (MMOL/L) IN SER/PLAS: 136 MMOL/L (ref 136–145)
THYROTROPIN (MIU/L) IN SER/PLAS BY DETECTION LIMIT <= 0.05 MIU/L: 3.02 MIU/L (ref 0.44–3.98)
TRIGLYCERIDE (MG/DL) IN SER/PLAS: 99 MG/DL (ref 0–149)
UREA NITROGEN (MG/DL) IN SER/PLAS: 21 MG/DL (ref 6–23)
VLDL: 20 MG/DL (ref 0–40)

## 2023-09-05 DIAGNOSIS — E78.00 PURE HYPERCHOLESTEROLEMIA, UNSPECIFIED: ICD-10-CM

## 2023-09-05 RX ORDER — ATORVASTATIN CALCIUM 20 MG/1
20 TABLET, FILM COATED ORAL DAILY
Qty: 90 TABLET | Refills: 3 | Status: SHIPPED | OUTPATIENT
Start: 2023-09-05

## 2023-09-05 NOTE — TELEPHONE ENCOUNTER
Recent Visits  Date Type Provider Dept   04/22/23 Office Visit Kelly J Slavik-Bosworth, APRN-CNP Do [id number] [facility]   03/13/23 Office Visit Fernando Caban MD Do [id number] [facility]   Showing recent visits within past 540 days and meeting all other requirements  Future Appointments  Date Type Provider Dept   09/11/23 Appointment Fernando Caban MD Do [id number] [facility]   Showing future appointments within next 180 days and meeting all other requirements

## 2023-09-07 NOTE — PROGRESS NOTES
"Subjective    Patient ID: Skyler Pena is a 67 y.o. male who presents for Follow-up.     The patient is compliant with medications. Patient denies any side effects to the medications.      Hypercholesteremia: The patient is presenting today for a follow up of hypercholesteremia. Is clinically stable so we will continue with current medications and lab work to confirm status ordered.      Hypothyroidism: Patient presents for a follow up of their thyroid function. Energy wise that patient is well. Is clinically stable so we will continue with current medications and lab work to confirm status ordered.    Elevated PSA: The patient is present today for a follow up of an elevated PSA. Their PSA levels from lab work are 5.91. He reports that he is not having incomplete emptying, weak stream, straining, postvoid dribbling, and there have been a few nights where he had to use the restroom more than 4 times due to large amounts of water intake .     Elevated fasting blood sugar: Patient is present today for a follow up of elevated fasting blood sugar. Glucose levels were 107    Shoulder Pain: The patient is presenting today with symptoms of bilateral shoulder pain. With an several months ago. They rate the pain out of 4/10. The patient has not been hospitalized for this in the last 6 months. Symptoms are alleviated by Ibuprofen. Symptoms are exacerbated by resting on the weekend; works a lot during the week.     Obesity: The patient is present for a follow up for obesity. The patient is continuously working on diet and exercise. The patient will continue working on strategies to maintain weight loss and stay well hydrated.     The patient denies having the following symptoms: chest pain, chest pressure, fever, chills, N/V/D, constipation, dizziness, headaches, SOB.        Objective   Vitals:  /88   Pulse (!) 47   Temp 36.4 °C (97.5 °F)   Resp 14   Ht 1.88 m (6' 2\")   Wt 130 kg (286 lb 9.6 oz)   SpO2 97%   BMI " 36.80 kg/m²     Physical Exam  Vitals reviewed.   Constitutional:       Appearance: Normal appearance. He is obese.   Neck:      Vascular: No carotid bruit.   Cardiovascular:      Rate and Rhythm: Normal rate and regular rhythm.      Pulses: Normal pulses.      Heart sounds: Normal heart sounds.   Pulmonary:      Effort: Pulmonary effort is normal. No respiratory distress.      Breath sounds: Normal breath sounds. No wheezing.   Abdominal:      General: There is no distension.      Palpations: Abdomen is soft. There is no mass.      Tenderness: There is no abdominal tenderness. There is no right CVA tenderness, left CVA tenderness, guarding or rebound.   Musculoskeletal:      Cervical back: Normal range of motion and neck supple. No rigidity.      Right lower leg: No edema.      Left lower leg: No edema.   Lymphadenopathy:      Cervical: No cervical adenopathy.   Neurological:      Mental Status: He is alert.         Labs reviewed from : 08/11/2023 CMP, CBC, Lipid, PSA 5.91;       Assessment/Plan   Problem List Items Addressed This Visit       Acquired hypothyroidism     Is clinically stable so we will continue with current medications and lab work to confirm status ordered.          Relevant Orders    TSH with reflex to Free T4 if abnormal    Elevated PSA     Is clinically stable so we will continue with current medications and lab work to confirm status ordered.          Relevant Orders    Prostate Specific Antigen    Hypercholesteremia - Primary     Is clinically stable so we will continue with current medications and lab work to confirm status ordered.          Relevant Orders    CBC and Auto Differential    Lipid Panel    Comprehensive Metabolic Panel    Class 2 severe obesity with serious comorbidity and body mass index (BMI) of 36.0 to 36.9 in adult (CMS/LTAC, located within [facility])      This condition is poorly controlled, therapeutic changes necessary. The patient is continuously working on diet and exercise. The patient  will continue working on strategies to maintain weight loss and stay well hydrated.            Elevated fasting blood sugar     Is clinically stable so we will continue with current medications and lab work to confirm status ordered.          Relevant Orders    Hemoglobin A1C    Chronic pain of both shoulders      Is stable, continue with current treatment. Will continue to monitor for worsening symptoms.          Other Visit Diagnoses       Encounter for immunization        Relevant Orders    Pneumococcal conjugate vaccine, 20-valent, adult (PREVNAR 20)            Follow up in: 6 month(s) or sooner if needed with labs prior.     Scribe Attestation  By signing my name below, IMei Scribe   attest that this documentation has been prepared under the direction and in the presence of Fernando Caban MD.

## 2023-09-11 ENCOUNTER — OFFICE VISIT (OUTPATIENT)
Dept: PRIMARY CARE | Facility: CLINIC | Age: 68
End: 2023-09-11
Payer: COMMERCIAL

## 2023-09-11 VITALS
TEMPERATURE: 97.5 F | HEIGHT: 74 IN | OXYGEN SATURATION: 97 % | RESPIRATION RATE: 14 BRPM | WEIGHT: 286.6 LBS | SYSTOLIC BLOOD PRESSURE: 150 MMHG | BODY MASS INDEX: 36.78 KG/M2 | HEART RATE: 47 BPM | DIASTOLIC BLOOD PRESSURE: 88 MMHG

## 2023-09-11 DIAGNOSIS — M25.511 CHRONIC PAIN OF BOTH SHOULDERS: ICD-10-CM

## 2023-09-11 DIAGNOSIS — R97.20 ELEVATED PSA: ICD-10-CM

## 2023-09-11 DIAGNOSIS — M25.512 CHRONIC PAIN OF BOTH SHOULDERS: ICD-10-CM

## 2023-09-11 DIAGNOSIS — E78.00 HYPERCHOLESTEREMIA: Primary | ICD-10-CM

## 2023-09-11 DIAGNOSIS — Z23 ENCOUNTER FOR IMMUNIZATION: ICD-10-CM

## 2023-09-11 DIAGNOSIS — G89.29 CHRONIC PAIN OF BOTH SHOULDERS: ICD-10-CM

## 2023-09-11 DIAGNOSIS — E03.9 ACQUIRED HYPOTHYROIDISM: ICD-10-CM

## 2023-09-11 DIAGNOSIS — E66.01 CLASS 2 SEVERE OBESITY DUE TO EXCESS CALORIES WITH SERIOUS COMORBIDITY AND BODY MASS INDEX (BMI) OF 36.0 TO 36.9 IN ADULT (MULTI): ICD-10-CM

## 2023-09-11 DIAGNOSIS — R73.01 ELEVATED FASTING BLOOD SUGAR: ICD-10-CM

## 2023-09-11 PROCEDURE — 3008F BODY MASS INDEX DOCD: CPT | Performed by: FAMILY MEDICINE

## 2023-09-11 PROCEDURE — 1036F TOBACCO NON-USER: CPT | Performed by: FAMILY MEDICINE

## 2023-09-11 PROCEDURE — 99214 OFFICE O/P EST MOD 30 MIN: CPT | Performed by: FAMILY MEDICINE

## 2023-09-11 PROCEDURE — 1159F MED LIST DOCD IN RCRD: CPT | Performed by: FAMILY MEDICINE

## 2023-09-11 PROCEDURE — 90677 PCV20 VACCINE IM: CPT | Performed by: FAMILY MEDICINE

## 2023-09-11 PROCEDURE — G0009 ADMIN PNEUMOCOCCAL VACCINE: HCPCS | Performed by: FAMILY MEDICINE

## 2023-09-11 PROCEDURE — 1160F RVW MEDS BY RX/DR IN RCRD: CPT | Performed by: FAMILY MEDICINE

## 2023-09-11 ASSESSMENT — ENCOUNTER SYMPTOMS
LOSS OF SENSATION IN FEET: 0
DEPRESSION: 0
OCCASIONAL FEELINGS OF UNSTEADINESS: 0

## 2023-09-12 ENCOUNTER — TELEPHONE (OUTPATIENT)
Dept: PRIMARY CARE | Facility: CLINIC | Age: 68
End: 2023-09-12
Payer: COMMERCIAL

## 2023-09-12 NOTE — TELEPHONE ENCOUNTER
EARNESTOM ASKING PATIENT TO RETURN PHONE CALL. PATIENT INFORMED ME AT HIS APPOINTMENT THAT HE HAD NEW INSURANCE BUT HIS WIFE HAD THE CARD. I WAS CALLING TO TRY TO UPDATE THE INFORMATION IN PATIENT'S CHART PER BILLING.

## 2023-11-22 DIAGNOSIS — E03.9 ACQUIRED HYPOTHYROIDISM: ICD-10-CM

## 2023-11-22 RX ORDER — LEVOTHYROXINE SODIUM 50 UG/1
50 TABLET ORAL DAILY
Qty: 30 TABLET | Refills: 5 | Status: SHIPPED | OUTPATIENT
Start: 2023-11-22

## 2023-11-22 NOTE — TELEPHONE ENCOUNTER
Recent Visits  Date Type Provider Dept   09/11/23 Office Visit Fernando Caban MD Do [id number] [facility]   04/22/23 Office Visit Kelly J Slavik-Bosworth, APRN-CNP Do [id number] [facility]   03/13/23 Office Visit Fernando Caban MD Do [id number] [facility]   Showing recent visits within past 540 days and meeting all other requirements  Future Appointments  Date Type Provider Dept   03/11/24 Appointment Fernando Caban MD Do [id number] [facility]   Showing future appointments within next 180 days and meeting all other requirements

## 2023-12-08 ENCOUNTER — TELEPHONE (OUTPATIENT)
Dept: PRIMARY CARE | Facility: CLINIC | Age: 68
End: 2023-12-08
Payer: COMMERCIAL

## 2024-03-11 ENCOUNTER — APPOINTMENT (OUTPATIENT)
Dept: PRIMARY CARE | Facility: CLINIC | Age: 69
End: 2024-03-11

## 2024-12-27 ENCOUNTER — HOSPITAL ENCOUNTER (EMERGENCY)
Facility: HOSPITAL | Age: 69
Discharge: HOME | End: 2024-12-27
Payer: COMMERCIAL

## 2024-12-27 ENCOUNTER — APPOINTMENT (OUTPATIENT)
Dept: CARDIOLOGY | Facility: HOSPITAL | Age: 69
End: 2024-12-27
Payer: COMMERCIAL

## 2024-12-27 ENCOUNTER — APPOINTMENT (OUTPATIENT)
Dept: RADIOLOGY | Facility: HOSPITAL | Age: 69
End: 2024-12-27
Payer: COMMERCIAL

## 2024-12-27 VITALS
TEMPERATURE: 96.8 F | DIASTOLIC BLOOD PRESSURE: 88 MMHG | SYSTOLIC BLOOD PRESSURE: 168 MMHG | HEART RATE: 54 BPM | OXYGEN SATURATION: 97 % | BODY MASS INDEX: 37.86 KG/M2 | WEIGHT: 295 LBS | RESPIRATION RATE: 10 BRPM | HEIGHT: 74 IN

## 2024-12-27 DIAGNOSIS — E03.9 ACQUIRED HYPOTHYROIDISM: ICD-10-CM

## 2024-12-27 DIAGNOSIS — Z76.0 MEDICATION REFILL: ICD-10-CM

## 2024-12-27 DIAGNOSIS — M71.22 BAKER'S CYST OF KNEE, LEFT: Primary | ICD-10-CM

## 2024-12-27 LAB
ALBUMIN SERPL BCP-MCNC: 3.6 G/DL (ref 3.4–5)
ALP SERPL-CCNC: 87 U/L (ref 33–136)
ALT SERPL W P-5'-P-CCNC: 22 U/L (ref 10–52)
ANION GAP SERPL CALC-SCNC: 10 MMOL/L (ref 10–20)
AST SERPL W P-5'-P-CCNC: 18 U/L (ref 9–39)
BASOPHILS # BLD AUTO: 0.05 X10*3/UL (ref 0–0.1)
BASOPHILS NFR BLD AUTO: 0.8 %
BILIRUB SERPL-MCNC: 0.6 MG/DL (ref 0–1.2)
BUN SERPL-MCNC: 16 MG/DL (ref 6–23)
CALCIUM SERPL-MCNC: 8.5 MG/DL (ref 8.6–10.3)
CHLORIDE SERPL-SCNC: 105 MMOL/L (ref 98–107)
CO2 SERPL-SCNC: 26 MMOL/L (ref 21–32)
CREAT SERPL-MCNC: 0.89 MG/DL (ref 0.5–1.3)
EGFRCR SERPLBLD CKD-EPI 2021: >90 ML/MIN/1.73M*2
EOSINOPHIL # BLD AUTO: 0.21 X10*3/UL (ref 0–0.7)
EOSINOPHIL NFR BLD AUTO: 3.4 %
ERYTHROCYTE [DISTWIDTH] IN BLOOD BY AUTOMATED COUNT: 12.6 % (ref 11.5–14.5)
GLUCOSE SERPL-MCNC: 108 MG/DL (ref 74–99)
HCT VFR BLD AUTO: 40.4 % (ref 41–52)
HGB BLD-MCNC: 13.4 G/DL (ref 13.5–17.5)
HOLD SPECIMEN: NORMAL
IMM GRANULOCYTES # BLD AUTO: 0.03 X10*3/UL (ref 0–0.7)
IMM GRANULOCYTES NFR BLD AUTO: 0.5 % (ref 0–0.9)
LYMPHOCYTES # BLD AUTO: 0.82 X10*3/UL (ref 1.2–4.8)
LYMPHOCYTES NFR BLD AUTO: 13.5 %
MAGNESIUM SERPL-MCNC: 1.99 MG/DL (ref 1.6–2.4)
MCH RBC QN AUTO: 30 PG (ref 26–34)
MCHC RBC AUTO-ENTMCNC: 33.2 G/DL (ref 32–36)
MCV RBC AUTO: 91 FL (ref 80–100)
MONOCYTES # BLD AUTO: 0.48 X10*3/UL (ref 0.1–1)
MONOCYTES NFR BLD AUTO: 7.9 %
NEUTROPHILS # BLD AUTO: 4.5 X10*3/UL (ref 1.2–7.7)
NEUTROPHILS NFR BLD AUTO: 73.9 %
NRBC BLD-RTO: 0 /100 WBCS (ref 0–0)
PLATELET # BLD AUTO: 263 X10*3/UL (ref 150–450)
POTASSIUM SERPL-SCNC: 4.2 MMOL/L (ref 3.5–5.3)
PROT SERPL-MCNC: 6.6 G/DL (ref 6.4–8.2)
RBC # BLD AUTO: 4.46 X10*6/UL (ref 4.5–5.9)
SODIUM SERPL-SCNC: 137 MMOL/L (ref 136–145)
T4 FREE SERPL-MCNC: 0.94 NG/DL (ref 0.61–1.12)
TSH SERPL-ACNC: 4.29 MIU/L (ref 0.44–3.98)
WBC # BLD AUTO: 6.1 X10*3/UL (ref 4.4–11.3)

## 2024-12-27 PROCEDURE — 84075 ASSAY ALKALINE PHOSPHATASE: CPT | Performed by: REGISTERED NURSE

## 2024-12-27 PROCEDURE — 93971 EXTREMITY STUDY: CPT

## 2024-12-27 PROCEDURE — 73564 X-RAY EXAM KNEE 4 OR MORE: CPT | Mod: 50

## 2024-12-27 PROCEDURE — 85025 COMPLETE CBC W/AUTO DIFF WBC: CPT | Performed by: REGISTERED NURSE

## 2024-12-27 PROCEDURE — 84443 ASSAY THYROID STIM HORMONE: CPT | Performed by: REGISTERED NURSE

## 2024-12-27 PROCEDURE — 36415 COLL VENOUS BLD VENIPUNCTURE: CPT | Performed by: REGISTERED NURSE

## 2024-12-27 PROCEDURE — 93005 ELECTROCARDIOGRAM TRACING: CPT

## 2024-12-27 PROCEDURE — 96374 THER/PROPH/DIAG INJ IV PUSH: CPT

## 2024-12-27 PROCEDURE — 84439 ASSAY OF FREE THYROXINE: CPT | Performed by: REGISTERED NURSE

## 2024-12-27 PROCEDURE — 2500000004 HC RX 250 GENERAL PHARMACY W/ HCPCS (ALT 636 FOR OP/ED): Performed by: REGISTERED NURSE

## 2024-12-27 PROCEDURE — 99285 EMERGENCY DEPT VISIT HI MDM: CPT | Mod: 25

## 2024-12-27 PROCEDURE — 73564 X-RAY EXAM KNEE 4 OR MORE: CPT | Mod: BILATERAL PROCEDURE | Performed by: RADIOLOGY

## 2024-12-27 PROCEDURE — 83735 ASSAY OF MAGNESIUM: CPT | Performed by: REGISTERED NURSE

## 2024-12-27 RX ORDER — MORPHINE SULFATE 4 MG/ML
4 INJECTION, SOLUTION INTRAMUSCULAR; INTRAVENOUS ONCE
Status: COMPLETED | OUTPATIENT
Start: 2024-12-27 | End: 2024-12-27

## 2024-12-27 RX ORDER — LEVOTHYROXINE SODIUM 50 UG/1
50 TABLET ORAL DAILY
Qty: 30 TABLET | Refills: 0 | Status: SHIPPED | OUTPATIENT
Start: 2024-12-27 | End: 2025-01-03 | Stop reason: SDUPTHER

## 2024-12-27 RX ORDER — NAPROXEN 500 MG/1
500 TABLET ORAL 2 TIMES DAILY PRN
Qty: 10 TABLET | Refills: 0 | Status: SHIPPED | OUTPATIENT
Start: 2024-12-27 | End: 2024-12-30 | Stop reason: SDUPTHER

## 2024-12-27 RX ADMIN — MORPHINE SULFATE 4 MG: 4 INJECTION, SOLUTION INTRAMUSCULAR; INTRAVENOUS at 11:40

## 2024-12-27 ASSESSMENT — COLUMBIA-SUICIDE SEVERITY RATING SCALE - C-SSRS
1. IN THE PAST MONTH, HAVE YOU WISHED YOU WERE DEAD OR WISHED YOU COULD GO TO SLEEP AND NOT WAKE UP?: NO
6. HAVE YOU EVER DONE ANYTHING, STARTED TO DO ANYTHING, OR PREPARED TO DO ANYTHING TO END YOUR LIFE?: NO
2. HAVE YOU ACTUALLY HAD ANY THOUGHTS OF KILLING YOURSELF?: NO

## 2024-12-27 ASSESSMENT — PAIN DESCRIPTION - LOCATION: LOCATION: KNEE

## 2024-12-27 ASSESSMENT — LIFESTYLE VARIABLES
HAVE YOU EVER FELT YOU SHOULD CUT DOWN ON YOUR DRINKING: NO
EVER HAD A DRINK FIRST THING IN THE MORNING TO STEADY YOUR NERVES TO GET RID OF A HANGOVER: NO
EVER FELT BAD OR GUILTY ABOUT YOUR DRINKING: NO
TOTAL SCORE: 0
HAVE PEOPLE ANNOYED YOU BY CRITICIZING YOUR DRINKING: NO

## 2024-12-27 ASSESSMENT — PAIN DESCRIPTION - FREQUENCY: FREQUENCY: CONSTANT/CONTINUOUS

## 2024-12-27 ASSESSMENT — PAIN - FUNCTIONAL ASSESSMENT: PAIN_FUNCTIONAL_ASSESSMENT: 0-10

## 2024-12-27 ASSESSMENT — PAIN DESCRIPTION - PAIN TYPE: TYPE: ACUTE PAIN

## 2024-12-27 ASSESSMENT — PAIN DESCRIPTION - DESCRIPTORS: DESCRIPTORS: ACHING;SHARP

## 2024-12-27 ASSESSMENT — PAIN SCALES - GENERAL: PAINLEVEL_OUTOF10: 8

## 2024-12-27 ASSESSMENT — PAIN DESCRIPTION - ONSET: ONSET: SUDDEN

## 2024-12-27 ASSESSMENT — PAIN DESCRIPTION - ORIENTATION: ORIENTATION: LEFT

## 2024-12-27 ASSESSMENT — PAIN DESCRIPTION - PROGRESSION: CLINICAL_PROGRESSION: NOT CHANGED

## 2024-12-27 NOTE — ED PROVIDER NOTES
HPI   Chief Complaint   Patient presents with    Lower Extremity Issue     Bilateral lower extremity swelling.  Also pain in shoulders and across upper back.     69-year-old male with past medical history significant for thyroid disease presents emergency department today with multiple medical complaints.  Patient tells me that he is having swelling in his left lower extremity.  He is having pain in both his knees and pain across the back of his shoulders.  Patient denies any injury or trauma.  Patient tells me in 2023 he did have rotator cuff injuries which he has had some improvement of those symptoms.  He also tells me that over the last week he has been experiencing increased pain in bilateral knees.  He tells me it hurts when he sitting and trying to get up from the toilet.  Patient tells me he noticed yesterday that he was swelling in the left lower extremity.  He tells me he stood up all days today drove to many different locations across [place].  He is concerned that he may have a DVT.  He also tells me that he stopped taking his thyroid medicine over the summer.  Patient tells me he did this for no particular reason was not directed to himself by his doctor.  Manage patient's wife was at [facility] tells me that he is concerned that his symptoms are related to him any longer taking his thyroid medicines.      History provided by:  Patient and spouse          Patient History   Past Medical History:   Diagnosis Date    COVID-19 02/02/2022    COVID-19    Disease of thyroid gland     Personal history of other diseases of the nervous system and sense organs 03/01/2022    History of eustachian tube dysfunction    Personal history of other diseases of the respiratory system 12/19/2022    History of sore throat    Syncope 12/23/2013     Past Surgical History:   Procedure Laterality Date    OTHER SURGICAL HISTORY  01/30/2020    No history of surgery     Family History   Problem Relation Name Age of Onset    Breast cancer  Mother      Lung cancer Mother       Social History     Tobacco Use    Smoking status: Never    Smokeless tobacco: Never   Substance Use Topics    Alcohol use: Yes     Alcohol/week: 6.0 standard drinks of alcohol     Types: 6 Cans of beer per week    Drug use: Never       Physical Exam   ED Triage Vitals [12/27/24 0959]   Temperature Heart Rate Respirations BP   36 °C (96.8 °F) 62 20 (!) 209/101      Pulse Ox Temp Source Heart Rate Source Patient Position   98 % Temporal Monitor Sitting      BP Location FiO2 (%)     Right arm --       Physical Exam  Vitals and nursing note reviewed.   Constitutional:       Appearance: Normal appearance.   HENT:      Head: Normocephalic and atraumatic.   Cardiovascular:      Rate and Rhythm: Normal rate and regular rhythm.      Pulses: Normal pulses.      Heart sounds: Normal heart sounds.      Comments: Left calf circumference is greater than right.  None pitting edema.  Patient has good pedal pulses.  MSPs intact distally.  Pulmonary:      Effort: Pulmonary effort is normal.      Breath sounds: Normal breath sounds.   Abdominal:      Palpations: Abdomen is soft.   Musculoskeletal:      Cervical back: Tenderness present. No spasms.      Right lower leg: No edema.      Left lower leg: Edema present.      Comments: Patient has tenderness in the cervical spine however he has full range of motion.  No step-offs no deformities noted.  Patient has equal strength in bilateral upper extremities, 5/5   Skin:     General: Skin is warm and dry.      Capillary Refill: Capillary refill takes less than 2 seconds.   Neurological:      General: No focal deficit present.      Mental Status: He is alert and oriented to person, place, and time.   Psychiatric:         Mood and Affect: Mood normal.         Behavior: Behavior normal.           ED Course & MDM   Diagnoses as of 12/27/24 1341   Baker's cyst of knee, left   Medication refill                 No data recorded     Emily Coma Scale Score: 15  (12/27/24 1058 : Audrey Van RN)                           Medical Decision Making    Patient seen exam emergency department; patient is healthy nontoxic appearance not appear in acute distress.  Patient's lung sounds are clear to auscultation without any adventitious noise.  Heart regular rate and rhythm without a murmur appreciated.  Skin is warm and dry no diaphoresis noted.  Patient is neurologically intact without any focal deficits.  Patient has no pain between his shoulder blades.  Patient has some tenderness in the paraspinal musculature of the cervical spine, no step-offs no deformities noted.  Patient has equal strength in bilateral upper extremities 5/5.    Patient and his wife tell me they are not as worried about his shoulder pain.  They are worried about his knee pain.  Additionally they are worried that his thyroid levels may be off as he has not taken his medications in several months.    Order an ultrasound of the left lower extremity rule out DVT.  Will also order bilateral imaging of knees to evaluate for osseous abnormalities..  Will also order basic labs as well as TSH with reflex if indicated.    EKG at 13:27 with ventricular rate of 57, as interpreted by me, shows a bradycardia with a first-degree AV block, normal axis, normal intervals. And nonspecific T wave abnormality with no evidence of acute ischemia or other acute findings for screening metabolic also noted on EKGs from December 2013    Patient CMP is unremarkable.  CBC with an H&H/of 13.4/40.4 otherwise unremarkable.  TSH is 4.9 however free T4 is 0.94.  Mag is 1.99.    Ultrasound of left lower extremity is negative for DVT however imaging of bilateral knees shows degenerative changes and a Baker's cyst in left lower extremity.    Did update patient and his family on results.  We did discuss the Baker's cyst.  We also discussed specifically discussed his TSH and free T results.  Patient given a prescription for 30 days of his  Synthroid.  I did recommend he follow-up with orthopedics and continue to take naproxen for knee pain.  All patient's questions and concerns were addressed prior to discharge.  Patient discharged home in stable condition.  Procedure  Procedures     Gilda Morel, NARGIS-CNP  12/27/24 1517

## 2024-12-28 LAB
ATRIAL RATE: 57 BPM
P AXIS: 36 DEGREES
P OFFSET: 151 MS
P ONSET: 90 MS
PR INTERVAL: 254 MS
Q ONSET: 217 MS
QRS COUNT: 10 BEATS
QRS DURATION: 100 MS
QT INTERVAL: 432 MS
QTC CALCULATION(BAZETT): 420 MS
QTC FREDERICIA: 424 MS
R AXIS: -23 DEGREES
T AXIS: 83 DEGREES
T OFFSET: 433 MS
VENTRICULAR RATE: 57 BPM

## 2024-12-30 ENCOUNTER — TELEPHONE (OUTPATIENT)
Dept: PRIMARY CARE | Facility: CLINIC | Age: 69
End: 2024-12-30
Payer: COMMERCIAL

## 2024-12-30 DIAGNOSIS — M25.511 CHRONIC PAIN OF BOTH SHOULDERS: Primary | ICD-10-CM

## 2024-12-30 DIAGNOSIS — M71.22 BAKER'S CYST OF KNEE, LEFT: ICD-10-CM

## 2024-12-30 DIAGNOSIS — M25.512 CHRONIC PAIN OF BOTH SHOULDERS: Primary | ICD-10-CM

## 2024-12-30 DIAGNOSIS — G89.29 CHRONIC PAIN OF BOTH SHOULDERS: Primary | ICD-10-CM

## 2024-12-30 RX ORDER — NAPROXEN 500 MG/1
500 TABLET ORAL 2 TIMES DAILY PRN
Qty: 60 TABLET | Refills: 0 | Status: SHIPPED | OUTPATIENT
Start: 2024-12-30 | End: 2025-01-03 | Stop reason: SDUPTHER

## 2024-12-30 NOTE — TELEPHONE ENCOUNTER
Patient's wife Deidra called in stating the patient was in the ER on 12/27 an was given naproxen 500 mg. She is wondering if Dr. Caban could prescribe a few extra days of this medication to  help him over until his appointment on 1/3. She stated he will run out of this medication on Wednesday  [facility]  Please Advise

## 2025-01-03 ENCOUNTER — LAB (OUTPATIENT)
Dept: LAB | Facility: LAB | Age: 70
End: 2025-01-03
Payer: MEDICARE

## 2025-01-03 ENCOUNTER — OFFICE VISIT (OUTPATIENT)
Dept: PRIMARY CARE | Facility: CLINIC | Age: 70
End: 2025-01-03
Payer: MEDICARE

## 2025-01-03 VITALS
HEART RATE: 52 BPM | OXYGEN SATURATION: 92 % | SYSTOLIC BLOOD PRESSURE: 150 MMHG | TEMPERATURE: 97.6 F | BODY MASS INDEX: 39.06 KG/M2 | WEIGHT: 304.4 LBS | DIASTOLIC BLOOD PRESSURE: 88 MMHG | RESPIRATION RATE: 16 BRPM | HEIGHT: 74 IN

## 2025-01-03 DIAGNOSIS — R97.20 ELEVATED PSA: ICD-10-CM

## 2025-01-03 DIAGNOSIS — Z00.00 ENCOUNTER FOR SUBSEQUENT ANNUAL WELLNESS VISIT (AWV) IN MEDICARE PATIENT: ICD-10-CM

## 2025-01-03 DIAGNOSIS — G89.29 CHRONIC PAIN OF BOTH SHOULDERS: ICD-10-CM

## 2025-01-03 DIAGNOSIS — I10 PRIMARY HYPERTENSION: ICD-10-CM

## 2025-01-03 DIAGNOSIS — E03.9 ACQUIRED HYPOTHYROIDISM: ICD-10-CM

## 2025-01-03 DIAGNOSIS — N13.9 OBSTRUCTIVE UROPATHY: ICD-10-CM

## 2025-01-03 DIAGNOSIS — M25.512 CHRONIC PAIN OF BOTH SHOULDERS: ICD-10-CM

## 2025-01-03 DIAGNOSIS — R94.31 ABNORMAL ECG: ICD-10-CM

## 2025-01-03 DIAGNOSIS — M25.50 POLYARTHRALGIA: ICD-10-CM

## 2025-01-03 DIAGNOSIS — Z00.00 ROUTINE GENERAL MEDICAL EXAMINATION AT HEALTH CARE FACILITY: ICD-10-CM

## 2025-01-03 DIAGNOSIS — E78.00 HYPERCHOLESTEREMIA: ICD-10-CM

## 2025-01-03 DIAGNOSIS — E78.00 PURE HYPERCHOLESTEROLEMIA, UNSPECIFIED: ICD-10-CM

## 2025-01-03 DIAGNOSIS — M71.22 BAKER'S CYST OF KNEE, LEFT: Primary | ICD-10-CM

## 2025-01-03 DIAGNOSIS — M25.511 CHRONIC PAIN OF BOTH SHOULDERS: ICD-10-CM

## 2025-01-03 PROBLEM — E66.812 CLASS 2 SEVERE OBESITY WITH SERIOUS COMORBIDITY AND BODY MASS INDEX (BMI) OF 39.0 TO 39.9 IN ADULT: Status: ACTIVE | Noted: 2025-01-03

## 2025-01-03 PROBLEM — E66.01 CLASS 2 SEVERE OBESITY WITH SERIOUS COMORBIDITY AND BODY MASS INDEX (BMI) OF 39.0 TO 39.9 IN ADULT: Status: ACTIVE | Noted: 2025-01-03

## 2025-01-03 PROBLEM — E66.812 CLASS 2 SEVERE OBESITY WITH SERIOUS COMORBIDITY AND BODY MASS INDEX (BMI) OF 36.0 TO 36.9 IN ADULT: Status: RESOLVED | Noted: 2023-01-29 | Resolved: 2025-01-03

## 2025-01-03 PROBLEM — E66.01 CLASS 2 SEVERE OBESITY WITH SERIOUS COMORBIDITY AND BODY MASS INDEX (BMI) OF 36.0 TO 36.9 IN ADULT: Status: RESOLVED | Noted: 2023-01-29 | Resolved: 2025-01-03

## 2025-01-03 PROBLEM — E66.01 CLASS 2 SEVERE OBESITY WITH SERIOUS COMORBIDITY AND BODY MASS INDEX (BMI) OF 39.0 TO 39.9 IN ADULT: Status: RESOLVED | Noted: 2025-01-03 | Resolved: 2025-01-03

## 2025-01-03 PROBLEM — E66.812 CLASS 2 SEVERE OBESITY WITH SERIOUS COMORBIDITY AND BODY MASS INDEX (BMI) OF 39.0 TO 39.9 IN ADULT: Status: RESOLVED | Noted: 2025-01-03 | Resolved: 2025-01-03

## 2025-01-03 LAB
CRP SERPL-MCNC: 3.62 MG/DL
ERYTHROCYTE [SEDIMENTATION RATE] IN BLOOD BY WESTERGREN METHOD: 35 MM/H (ref 0–20)
PSA SERPL-MCNC: 10.01 NG/ML

## 2025-01-03 PROCEDURE — 85652 RBC SED RATE AUTOMATED: CPT

## 2025-01-03 PROCEDURE — 84153 ASSAY OF PSA TOTAL: CPT

## 2025-01-03 PROCEDURE — 86140 C-REACTIVE PROTEIN: CPT

## 2025-01-03 RX ORDER — LEVOTHYROXINE SODIUM 50 UG/1
50 TABLET ORAL DAILY
Qty: 90 TABLET | Refills: 3 | Status: SHIPPED | OUTPATIENT
Start: 2025-01-03

## 2025-01-03 RX ORDER — ATORVASTATIN CALCIUM 20 MG/1
20 TABLET, FILM COATED ORAL DAILY
Qty: 90 TABLET | Refills: 3 | Status: SHIPPED | OUTPATIENT
Start: 2025-01-03

## 2025-01-03 RX ORDER — NAPROXEN 500 MG/1
500 TABLET ORAL 2 TIMES DAILY PRN
Qty: 180 TABLET | Refills: 1 | Status: SHIPPED | OUTPATIENT
Start: 2025-01-03 | End: 2025-07-02

## 2025-01-03 RX ORDER — LISINOPRIL 10 MG/1
10 TABLET ORAL DAILY
Qty: 90 TABLET | Refills: 3 | Status: SHIPPED | OUTPATIENT
Start: 2025-01-03 | End: 2025-12-29

## 2025-01-03 ASSESSMENT — ACTIVITIES OF DAILY LIVING (ADL)
DOING_HOUSEWORK: INDEPENDENT
MANAGING_FINANCES: INDEPENDENT
BATHING: INDEPENDENT
DRESSING: INDEPENDENT
TAKING_MEDICATION: INDEPENDENT
GROCERY_SHOPPING: INDEPENDENT

## 2025-01-03 ASSESSMENT — ENCOUNTER SYMPTOMS
ARTHRALGIAS: 1
DEPRESSION: 0
NECK PAIN: 1
LOSS OF SENSATION IN FEET: 0
CONSTIPATION: 1
OCCASIONAL FEELINGS OF UNSTEADINESS: 0

## 2025-01-03 ASSESSMENT — PATIENT HEALTH QUESTIONNAIRE - PHQ9
1. LITTLE INTEREST OR PLEASURE IN DOING THINGS: NOT AT ALL
SUM OF ALL RESPONSES TO PHQ9 QUESTIONS 1 AND 2: 0
2. FEELING DOWN, DEPRESSED OR HOPELESS: NOT AT ALL

## 2025-01-03 NOTE — TELEPHONE ENCOUNTER
Rx Refill Request Telephone Encounter    Name:  Skyler Pena  :  [id number]  Medication Name:  tamsulosin (Flomax) 0.4 mg 24 hr capsule     PT ASKED IF DR. CARTER WILL TAKE OVER THIS RX. HE WAS UNDER THE IMPRESSION THAT THIS WAS ALSO GOING TO BE CALLED IN TODAY, AFTER HIS APPOINTMENT. PLEASE ADVISE.     Specific Pharmacy location:  [facility]  Date of last appointment:  1/3/25  Date of next appointment:  4/3/25  Best number to reach patient:  [phone]

## 2025-01-03 NOTE — PROGRESS NOTES
Subjective   Patient ID: Skyler Pena is a 69 y.o. male who presents for Baker's cyst (Left knee), Hyperlipidemia (No lionger ta), Hypothyroidism (Pt was put on 50mcg of levothyroxine due to TSH.), Obstructive Uropathy, Obesity, Joint Pain (Hands bilateral, shoulders bilateral. Naproxen is helping but after 12hrs he can feel pain.), and Medicare Annual Wellness Visit Subsequent.    Joint Pain: States he has bilateral pain in his hands and shoulders. Has been ongoing and been getting worse. He has been taking Naproxen for the pain with little relief. He will get a substantial cramp in his neck at some times when he moves to quickly.    Hyperlipidemia: Patient states he is no longer taking the medication and doing fine.    Hypothyroidism: Patient stopped taking his Levothyroxine in the middle of the summer before the pain and joint issues started. His wife is worried that this could have caused the joint swelling. Recent blood work done in December shows that his TSH is slightly high. He has started retaking the 50mcg dose this last week.     Obstructive Uropathy: Patient states he is having no issues at this time.     Baker's Cyst: States he woke up last Thursday and it was very painful in his left knee to the point he went to the ER. Struggles getting up and down out of a seat, states he has to get his feet under him before he start walking. He has no issues with walking around for periods of time though.  At the emergency room he did have an EKG done which shows some abnormalities sent some suggestion of left ventricular hypertrophy.  Patient complains today of constipation.     Patient asked about ozempic today for weight loss. He states he has tried dieting in the past but not stuck with it. His wife has tried to get him to start walking with her but he is hesitant with his current issues. She states now they have tried to change how they eat, and limit his snacking.    Encounter for subsequent AWV: Medicare  "wellness visit done, patient is in satisfactory living circumstances where the patient's needs are met.  This patient is advised to develop or update their living will and provide us a copy for the chart.    The patients living will is non-active. His POA is wife Deidra Pena, back-up POA is daughter Audrey Iqbal.  The patients current code status is FULL CODE. The patient does not want to linger on machines. .      Review of Systems   Gastrointestinal:  Positive for constipation.   Musculoskeletal:  Positive for arthralgias and neck pain.   All other systems reviewed and are negative.      Patient Care Team:  Fernando Caban MD as PCP - General  Fernando Caban MD as PCP - [facility] [employer] PCP     Objective   /88   Pulse 52   Temp 36.4 °C (97.6 °F)   Resp 16   Ht 1.88 m (6' 2\")   Wt 138 kg (304 lb 6.4 oz)   SpO2 92%   BMI 39.08 kg/m²     Physical Exam  Vitals reviewed.   Constitutional:       Appearance: Normal appearance.   Neck:      Vascular: No carotid bruit.   Cardiovascular:      Rate and Rhythm: Normal rate and regular rhythm.      Pulses: Normal pulses.      Heart sounds: Normal heart sounds.   Pulmonary:      Effort: Pulmonary effort is normal. No respiratory distress.      Breath sounds: Normal breath sounds. No wheezing.   Abdominal:      General: There is no distension.      Palpations: Abdomen is soft. There is no mass.      Tenderness: There is no abdominal tenderness. There is no right CVA tenderness, left CVA tenderness, guarding or rebound.   Musculoskeletal:      Cervical back: Normal range of motion and neck supple. No rigidity.      Right lower leg: No edema.      Left lower leg: No edema.      Comments: Tenderness PF left knee.   Lymphadenopathy:      Cervical: No cervical adenopathy.   Neurological:      Mental Status: He is alert.       CBC, CMP, knee x-rays, vascular ultrasound of leg to rule out DVT and ECG from emergency room on 12/27/2024 all " reviewed.      Assessment/Plan   Problem List Items Addressed This Visit       Acquired hypothyroidism    Relevant Medications    levothyroxine (Synthroid, Levoxyl) 50 mcg tablet    Elevated PSA    Relevant Orders    Prostate Specific Antigen, Screen    Pure hypercholesterolemia, unspecified    Relevant Medications    atorvastatin (Lipitor) 20 mg tablet    Encounter for subsequent annual wellness visit (AWV) in Medicare patient     Medicare wellness visit done, patient is in satisfactory living circumstances where the patient's needs are met.  This patient is advised to develop or update their living will and provide us a copy for the chart.         Chronic pain of both shoulders    Relevant Medications    naproxen (Naprosyn) 500 mg tablet    Primary hypertension    Relevant Medications    lisinopril 10 mg tablet     Other Visit Diagnoses       Baker's cyst of knee, left    -  Primary    Relevant Orders    Referral to Orthopaedic Surgery    Follow Up In [facility] - PCP - Established    Polyarthralgia        Relevant Orders    C-reactive protein    Sedimentation Rate (Completed)    Abnormal ECG        Relevant Orders    Transthoracic Echo (TTE) Complete    Routine general medical examination at health care facility        Relevant Orders    1 Year Follow Up In [facility] - PCP - Wellness Exam          The above diagnoses are stable or well-controlled and we will continue with the current treatments unless noted above.    Follow up in: 3 month(s) or sooner if needed with labs today.    Scribe Attestation  By signing my name below, I, La Burden   attest that this documentation has been prepared under the direction and in the presence of Fernando Caban MD.

## 2025-01-04 RX ORDER — TAMSULOSIN HYDROCHLORIDE 0.4 MG/1
0.4 CAPSULE ORAL DAILY
Qty: 90 CAPSULE | Refills: 1 | Status: SHIPPED | OUTPATIENT
Start: 2025-01-04

## 2025-01-06 ENCOUNTER — OFFICE VISIT (OUTPATIENT)
Dept: ORTHOPEDIC SURGERY | Facility: CLINIC | Age: 70
End: 2025-01-06
Payer: MEDICARE

## 2025-01-06 DIAGNOSIS — M76.891 PES ANSERINUS TENDINITIS OF BOTH LOWER EXTREMITIES: ICD-10-CM

## 2025-01-06 DIAGNOSIS — M76.32 BILATERAL ILIOTIBIAL BAND TENDINITIS: ICD-10-CM

## 2025-01-06 DIAGNOSIS — M76.892 PES ANSERINUS TENDINITIS OF BOTH LOWER EXTREMITIES: ICD-10-CM

## 2025-01-06 DIAGNOSIS — M17.0 OSTEOARTHRITIS OF BOTH KNEES, UNSPECIFIED OSTEOARTHRITIS TYPE: ICD-10-CM

## 2025-01-06 DIAGNOSIS — M76.31 BILATERAL ILIOTIBIAL BAND TENDINITIS: ICD-10-CM

## 2025-01-06 PROCEDURE — 1036F TOBACCO NON-USER: CPT | Performed by: STUDENT IN AN ORGANIZED HEALTH CARE EDUCATION/TRAINING PROGRAM

## 2025-01-06 PROCEDURE — 1123F ACP DISCUSS/DSCN MKR DOCD: CPT | Performed by: STUDENT IN AN ORGANIZED HEALTH CARE EDUCATION/TRAINING PROGRAM

## 2025-01-06 PROCEDURE — 99214 OFFICE O/P EST MOD 30 MIN: CPT | Performed by: STUDENT IN AN ORGANIZED HEALTH CARE EDUCATION/TRAINING PROGRAM

## 2025-01-06 PROCEDURE — 1159F MED LIST DOCD IN RCRD: CPT | Performed by: STUDENT IN AN ORGANIZED HEALTH CARE EDUCATION/TRAINING PROGRAM

## 2025-01-06 PROCEDURE — 99204 OFFICE O/P NEW MOD 45 MIN: CPT | Performed by: STUDENT IN AN ORGANIZED HEALTH CARE EDUCATION/TRAINING PROGRAM

## 2025-01-06 RX ORDER — METHYLPREDNISOLONE 4 MG/1
TABLET ORAL
Qty: 21 TABLET | Refills: 0 | Status: SHIPPED | OUTPATIENT
Start: 2025-01-06

## 2025-01-06 NOTE — PROGRESS NOTES
Acute Injury New Patient Visit    HPI: Skyler is a 69 y.o.male who presents today with new complaints of Bilateral knee pain.  The left is worse than the right.  He denies any falls or injuries.  Pain began in late November.  He had swelling into the low left calf after a trip for [email or web address], and was seen at the ED on 12/27/2024, and found to not have any evidence of acute DVT on duplex ultrasound of the left lower extremity.  The pain is anterior medial.  He is also having hip, shoulder, and wrist pain.  Back in November he started using the stationary bike.  Ibuprofen helped a little bit.  He was placed on naproxen from his PCP.  PCP also ordered a workup for rheumatologic causes.    Plan: For this bilateral IT band tendinitis, bilateral pes anserine tendinitis, bilateral mild OA of the knees, as well as a concern for a rheumatologic cause of his polyarthralgias, we will start him on a Medrol Dosepak, pauses NSAIDs while he is taking the Medrol, start physical therapy, follow-up with the PCP for workup of his rheumatologic blood work, and follow-up in 4 to 5 weeks see how he is doing.    Assessment:   Problem List Items Addressed This Visit    None  Visit Diagnoses       Bilateral iliotibial band tendinitis        Relevant Medications    methylPREDNISolone (Medrol Dospak) 4 mg tablets    Other Relevant Orders    Referral to Physical Therapy    Pes anserinus tendinitis of both lower extremities        Relevant Medications    methylPREDNISolone (Medrol Dospak) 4 mg tablets    Other Relevant Orders    Referral to Physical Therapy    Osteoarthritis of both knees, unspecified osteoarthritis type        Relevant Medications    methylPREDNISolone (Medrol Dospak) 4 mg tablets    Other Relevant Orders    Referral to Physical Therapy            Diagnostics: Reviewed all relevant imaging including x-ray, MRI, CT, and US.      Procedure:  Procedures    Physical Exam:  GENERAL:  No obvious acute distress.  NEURO:  Distally  neurovascularly intact.  Sensation intact to light touch.  Extremity: Bilateral knee examination shows:  Skin is intact;  No erythema or warmth;  No edema or ecchymosis;  No effusion;  Can flex the left knee to 130 degrees;  Full extension at 0 degrees;  No pain over the patella;  Negative patellar grind test;  TENDER over the medial joint line overlying the pes anserine insertion;  TENDER over the lateral joint line overlying the distal IT band;  No pain over the patellar or quadricep tendon;  No pain over the proximal tibia;  No pain over the popliteal fossa;  Negative valgus stress test;  Negative varus stress test;  Negative Jimmy's test medially with no instability;  Negative Jimmy's test laterally with no instability;  Negative Lachman's test;  Patellar and quadricep mechanism intact;  Negative anterior and posterior drawer test;  Negative patellar apprehension test;  Distal pulses are palpable;  Neurovascularly intact; and  Walking with no significant antalgic gait.    Orders Placed This Encounter    Referral to Physical Therapy    methylPREDNISolone (Medrol Dospak) 4 mg tablets      At the conclusion of the visit there were no further questions by the patient/family regarding their plan of care.  Patient was instructed to call or return with any issues, questions, or concerns regarding their injury and/or treatment plan described above.     01/06/25 at 12:45 PM - José Cody,     Office: [phone]    This note was prepared using voice recognition software.  The details of this note are correct and have been reviewed, and corrected to the best of my ability.  Some grammatical errors may persist related to the Dragon software.

## 2025-01-09 ENCOUNTER — EVALUATION (OUTPATIENT)
Dept: PHYSICAL THERAPY | Facility: CLINIC | Age: 70
End: 2025-01-09
Payer: MEDICARE

## 2025-01-09 DIAGNOSIS — M76.32 BILATERAL ILIOTIBIAL BAND TENDINITIS: ICD-10-CM

## 2025-01-09 DIAGNOSIS — M76.891 PES ANSERINUS TENDINITIS OF BOTH LOWER EXTREMITIES: ICD-10-CM

## 2025-01-09 DIAGNOSIS — M76.892 PES ANSERINUS TENDINITIS OF BOTH LOWER EXTREMITIES: ICD-10-CM

## 2025-01-09 DIAGNOSIS — M17.0 OSTEOARTHRITIS OF BOTH KNEES, UNSPECIFIED OSTEOARTHRITIS TYPE: ICD-10-CM

## 2025-01-09 DIAGNOSIS — M76.31 BILATERAL ILIOTIBIAL BAND TENDINITIS: ICD-10-CM

## 2025-01-09 PROCEDURE — 97110 THERAPEUTIC EXERCISES: CPT | Mod: GP

## 2025-01-09 PROCEDURE — 97161 PT EVAL LOW COMPLEX 20 MIN: CPT | Mod: GP

## 2025-01-09 ASSESSMENT — ENCOUNTER SYMPTOMS
OCCASIONAL FEELINGS OF UNSTEADINESS: 0
DEPRESSION: 0
LOSS OF SENSATION IN FEET: 0

## 2025-01-09 ASSESSMENT — PAIN - FUNCTIONAL ASSESSMENT: PAIN_FUNCTIONAL_ASSESSMENT: 0-10

## 2025-01-09 ASSESSMENT — PAIN SCALES - GENERAL: PAINLEVEL_OUTOF10: 1

## 2025-01-09 NOTE — PROGRESS NOTES
"Physical Therapy Evaluation    Patient Name: Skyler Pena  MRN: [id number]  Time Calculation  Start Time: 0900  Stop Time: 0935  Time Calculation (min): 35 min  PT Evaluation Time Entry  PT Evaluation (Low) Time Entry: 15  PT Therapeutic Procedures Time Entry  Therapeutic Exercise Time Entry: 20                   Current Problem  1. Bilateral iliotibial band tendinitis  Referral to Physical Therapy    Follow Up In Physical Therapy      2. Pes anserinus tendinitis of both lower extremities  Referral to Physical Therapy    Follow Up In Physical Therapy      3. Osteoarthritis of both knees, unspecified osteoarthritis type  Referral to Physical Therapy    Follow Up In Physical Therapy        Insurance    Insurance reviewed   Visit number: 1  MMO MEDICARE BMN PT OT COPAY 30 DED 0,COVERAGE 100 OOP [phone](0) 3300(0) NO AUTH REQ       Subjective   General:  Patient is a 70 y/o M who is here today fro c/o B knee pain (L>R) for approx 3 months. Patient reports that he has a very physical job, and feels that he has been getting very sore from working. Around [facility], he was having some difficulty with getting up and down stairs, he did a lot of traveling around [place] and had a lot of swelling, did have US for DVT, which was (-). Patient reports that he recently saw the doctor, who put him a Medrol Dosepak which has significantly helped his pain. Patient does feel at times that his knees will buckle, but denies any falls. Denies any locking/catching/buckling, denies any numbness/tingling. Denies any new onset bowel/bladder dysfunction, denies saddle anesthesia, denies unexplained weight loss. Pain is mostly located behind his kneecaps, does also have pain along medial and lateral joint lines, and behind his knee on the L (does have known Baker's Cyst on L).     PMHx significant of; thyroid disorder    Pt goal for PT: \"lose weight and decrease pain\"  Precautions:  None   Pain:  Current: 1/10, B knees, aching  Worst: " 10/10  Best: 1/10  Pain Exacerbating Factors: prolonged standing/sitting, walking, bending, lifting, squatting, stairs, kneeling, sleeping, work duties, ADLs/IADLs  Pain Relieving Factors: Medrol Dosepak, naproxen, ice, heat    Reviewed medical screening form with pt and medical screening assessed    Imaging:   RIGHT KNEE:  There is no displaced fracture.  Mild tricompartmental degenerative change.  The alignment is anatomic.  No soft tissue abnormality is seen.  There is no joint effusion.  LEFT KNEE:  There is no displaced fracture.  Mild tricompartmental degenerative change.  The alignment is anatomic.  No soft tissue abnormality is seen.  There is no joint effusion.    Objective   Knee Musculoskeletal Exam  Gait  Gait additional comments: Decreased gait speed, decreased step length, decreased knee flexion B during swing phase of gait, decreased foot clearance B, increased double support time    Palpation    Right      Tenderness: present          Lateral joint line: mild          Medial joint line: mild          Pes anserinus: mild      Left      Tenderness: present          Lateral joint line: mild          Medial hamstring: mild          Medial joint line: mild          Pes anserinus: mild      Range of Motion    Right      Active extension: 0      Right knee active flexion: 122 P!    Left      Active extension: 0      Active flexion: 115 (P!)    Strength    Right      Extension: 5/5.       Flexion: 4+/5.     Left      Extension: 4+/5.       Flexion: 4/5.      Instability    Right      Varus stress grade: normal      Valgus stress grade: normal      Anterior drawer: normal      Posterior drawer: normal      Medial Jimmy test: negative      Lateral Jimmy test: negative    Left      Varus stress grade: normal      Valgus stress grade: normal      Anterior drawer: normal      Posterior drawer: normal      Medial Jimmy test: negative      Lateral Jimmy test: negative    Special Signs    Right       Patellar compression: none      Left      Patellar compression: none         Outcome Measures:  Other Measures  Lower Extremity Funtional Score (LEFS): 40     Treatment: see HEP below    EDUCATION/HEP:  Access Code: [email or web address]  URL: [email or web address]  Date: 01/09/2025  Prepared by: Elvia Orlando    Exercises  - Supine Hamstring Stretch with Strap  - 1 x daily - 7 x weekly - 3 sets - 20-30 sec hold  - Supine ITB Stretch with Strap  - 1 x daily - 7 x weekly - 3 sets - 20-30 sec hold  - Supine Quadriceps Stretch with Strap on Table  - 1 x daily - 7 x weekly - 3 sets - 20-30 sec hold  - Active Straight Leg Raise with Quad Set  - 1 x daily - 7 x weekly - 2 sets - 10 reps  - Supine Bridge  - 1 x daily - 7 x weekly - 2 sets - 10 reps - 2-3 sec hold  - Hooklying Clamshell with Resistance  - 1 x daily - 7 x weekly - 2 sets - 10 reps  - Seated Long Arc Quad with Hip Adduction  - 1 x daily - 7 x weekly - 2 sets - 10 reps - 2-3 sec hold    Assessment:  Patient is a 70 y/o M who participated in PT evaluation this date for c/o B knee pain. Patient presents with pain, decreased B knee ROM, decreased BLE strength, impaired gait and impaired balance. Due to these impairments, pt has increased difficulty with prolonged sitting/standing, walking, bending, lifting, squatting, stairs, kneeling, sleeping, performing work duties and performing ADLs/IADLs. Pt would benefit from PT services to decrease pain, increase ROM/tissue mobility, improve strength, gait and balance to facilitate return to prior level of activities as able.    Problem List: activity limitations, ADLs/IADLs/self care skills, decreased functional level, decreased knowledge of HEP, decreased knowledge of precautions, flexibility, pain, participation restrictions, posture, range of motion/joint mobility and strength.     Clinical Presentation: Stable     Level of Complexity: Low    Goals:  Pt will be independent with advanced HEP   Pt will  increase Gigi LE strength 4+-5/5 including good eccentric quad to perform all functional mobility  Pt will demo B knee AROM 0-120 deg to perform all functional mobility  Pt will demo B LE SLS >/=10 sec without UE assist on compliant surface for functional carryover into dynamic balance  Pt will negotiate flight of stairs mod I reciprocally without increased knee pain  Pt will ambulate community distances independent over varying surfaces/inclines without increased B knee pain                    Pt will improve LEFS score by at least 9 points (MCID) to indicate significant improvement in functional abilities.      Plan  1x/week for 5 visits     Skilled therapeutic intervention to address the above mentioned physical and functional impairments and limitations including, but not limited to: patient education, therapeutic exercise, therapeutic activity, manual therapy, body mechanics training, dry needling, blood flow restriction training, instrumented soft tissue mobilization, manual soft tissue mobilization, gait retraining, biofeedback, cryotherapy, electrical stimulation, home program development, hot pack, taping, neuromuscular re-education, self-care/home management, and vasopneumatic compression.

## 2025-01-15 ENCOUNTER — LAB (OUTPATIENT)
Dept: LAB | Facility: LAB | Age: 70
End: 2025-01-15
Payer: MEDICARE

## 2025-01-15 DIAGNOSIS — M25.50 POLYARTHRALGIA: ICD-10-CM

## 2025-01-15 LAB — RHEUMATOID FACT SER NEPH-ACNC: <10 IU/ML (ref 0–15)

## 2025-01-15 PROCEDURE — 86431 RHEUMATOID FACTOR QUANT: CPT

## 2025-01-15 PROCEDURE — 86235 NUCLEAR ANTIGEN ANTIBODY: CPT

## 2025-01-15 PROCEDURE — 86225 DNA ANTIBODY NATIVE: CPT

## 2025-01-15 PROCEDURE — 86038 ANTINUCLEAR ANTIBODIES: CPT

## 2025-01-15 NOTE — PROGRESS NOTES
"  Physical Therapy Treatment    Patient Name: Skyler Pena  MRN: [id number]  Today's Date: 1/17/2025  Time Calculation  Start Time: 0656  Stop Time: 0736  Time Calculation (min): 40 min     PT Therapeutic Procedures Time Entry  Therapeutic Exercise Time Entry: 40                   Current Problem  1. Chronic pain of both knees        2. Bilateral iliotibial band tendinitis  Follow Up In Physical Therapy      3. Pes anserinus tendinitis of both lower extremities  Follow Up In Physical Therapy      4. Osteoarthritis of both knees, unspecified osteoarthritis type  Follow Up In Physical Therapy          Insurance   Payer: Insurance reviewed   Visit number: 1  MMO MEDICARE BMN PT OT COPAY 30 DED 0,COVERAGE 100 OOP [phone](0) 3300(0)    Visit Number: 2  Approved Visits: NO AUTH REQ      Precautions       Subjective   Pt notes continued pain, but has started again w/ medication that has helped alleviate pain.  Pain is constant, but tolerable.  Pain  Pain Assessment: 0-10  0-10 (Numeric) Pain Score: 2  Pain Location: Knee  Pain Orientation: Right, Left      Objective     Treatments:  Ther Ex: BL Knees  JEREMY 6' (seat #13)  Seated HS Stretches 3x30\" BL  LAQ 10x10\" BL  DBL LAQ w/ Ball at Ankles 3x10  SLR 3x10 BL  SAQ 2# 2x10 BL  Bridges hold 5\" 2x10  Leg over table Quad Strap Stretch 2x30\" BL  Clamshells RTB 2x10 BL      Assessment:  Pt able to tolerate tx session well this date w/ no adverse effects noted from tx.  Pt compliant w/ program and appears to understand rationale for therapy.  Still requires skilled therapy for increasing strength/ROM for performing ADLs.      Plan:  D/t noted impairments and dysfunction of  BL Knees, PT will cont to address deficits of strength and ROM via therapeutic exs and modalities PRN and further assist w/ pain reduction.                    "

## 2025-01-16 ENCOUNTER — HOSPITAL ENCOUNTER (OUTPATIENT)
Dept: RADIOLOGY | Facility: EXTERNAL LOCATION | Age: 70
Discharge: HOME | End: 2025-01-16

## 2025-01-16 ENCOUNTER — OFFICE VISIT (OUTPATIENT)
Dept: ORTHOPEDIC SURGERY | Facility: CLINIC | Age: 70
End: 2025-01-16
Payer: MEDICARE

## 2025-01-16 DIAGNOSIS — M17.12 PRIMARY OSTEOARTHRITIS OF LEFT KNEE: ICD-10-CM

## 2025-01-16 PROCEDURE — 99213 OFFICE O/P EST LOW 20 MIN: CPT | Mod: 25 | Performed by: STUDENT IN AN ORGANIZED HEALTH CARE EDUCATION/TRAINING PROGRAM

## 2025-01-16 PROCEDURE — 2500000004 HC RX 250 GENERAL PHARMACY W/ HCPCS (ALT 636 FOR OP/ED): Performed by: STUDENT IN AN ORGANIZED HEALTH CARE EDUCATION/TRAINING PROGRAM

## 2025-01-16 PROCEDURE — 20611 DRAIN/INJ JOINT/BURSA W/US: CPT | Mod: LT | Performed by: STUDENT IN AN ORGANIZED HEALTH CARE EDUCATION/TRAINING PROGRAM

## 2025-01-16 PROCEDURE — 99213 OFFICE O/P EST LOW 20 MIN: CPT | Performed by: STUDENT IN AN ORGANIZED HEALTH CARE EDUCATION/TRAINING PROGRAM

## 2025-01-16 PROCEDURE — L1812 KO ELASTIC W/JOINTS PRE OTS: HCPCS | Performed by: STUDENT IN AN ORGANIZED HEALTH CARE EDUCATION/TRAINING PROGRAM

## 2025-01-16 PROCEDURE — 1123F ACP DISCUSS/DSCN MKR DOCD: CPT | Performed by: STUDENT IN AN ORGANIZED HEALTH CARE EDUCATION/TRAINING PROGRAM

## 2025-01-16 RX ORDER — LIDOCAINE HYDROCHLORIDE 10 MG/ML
6 INJECTION, SOLUTION INFILTRATION; PERINEURAL
Status: COMPLETED | OUTPATIENT
Start: 2025-01-16 | End: 2025-01-16

## 2025-01-16 RX ORDER — BETAMETHASONE SODIUM PHOSPHATE AND BETAMETHASONE ACETATE 3; 3 MG/ML; MG/ML
18 INJECTION, SUSPENSION INTRA-ARTICULAR; INTRALESIONAL; INTRAMUSCULAR; SOFT TISSUE
Status: COMPLETED | OUTPATIENT
Start: 2025-01-16 | End: 2025-01-16

## 2025-01-16 RX ADMIN — LIDOCAINE HYDROCHLORIDE 6 ML: 10 INJECTION, SOLUTION INFILTRATION; PERINEURAL at 19:15

## 2025-01-16 RX ADMIN — BETAMETHASONE ACETATE AND BETAMETHASONE SODIUM PHOSPHATE 18 MG: 3; 3 INJECTION, SUSPENSION INTRA-ARTICULAR; INTRALESIONAL; INTRAMUSCULAR; SOFT TISSUE at 19:15

## 2025-01-16 NOTE — PROGRESS NOTES
Acute Injury New Patient Visit    HPI: Skyler is a 69 y.o.male who presents today for follow-up of his bilateral IT band tendinitis, bilateral pes anserine tendinitis, bilateral mild OA of the knees as well as a concern for rheumatologic cause of his polyarthralgias.  Unfortunately, he began having severe pain over the left knee, after having an initial improvement on the Medrol Dosepak.  He went back on the naproxen, which seems to help a little bit.  Unfortunately, yesterday, he had 10 out of 10 pain.  He has swelling about the knee.  He has a Baker's cyst.  He denies any falls or injuries.    On 1/6/2025, he presented with new complaints of Bilateral knee pain.  The left is worse than the right.  He denies any falls or injuries.  Pain began in late November.  He had swelling into the low left calf after a trip for [place], and was seen at the ED on 12/27/2024, and found to not have any evidence of acute DVT on duplex ultrasound of the left lower extremity.  The pain is anterior medial.  He is also having hip, shoulder, and wrist pain.  Back in November he started using the stationary bike.  Ibuprofen helped a little bit.  He was placed on naproxen from his PCP.  PCP also ordered a workup for rheumatologic causes.    Plan: Today, on 1/16/2025, provided him with a corticosteroid injection of the left knee which tolerated well without complications.  Postinjection instructions given.  Also fitted him with a free sport knee brace.  Continue other conservative treat measures as discussed at prior visit.  Follow-up in 4 weeks.    On 1/6/2025, for this bilateral IT band tendinitis, bilateral pes anserine tendinitis, bilateral mild OA of the knees, as well as a concern for a rheumatologic cause of his polyarthralgias, we will start him on a Medrol Dosepak, pauses NSAIDs while he is taking the Medrol, start physical therapy, follow-up with the PCP for workup of his rheumatologic blood work, and follow-up in 4 to 5  weeks see how he is doing.    Assessment:   Problem List Items Addressed This Visit    None  Visit Diagnoses       Primary osteoarthritis of left knee        Relevant Orders    Point of Care Ultrasound (Completed)    Knee Brace, Hinged              Diagnostics: Reviewed all relevant imaging including x-ray, MRI, CT, and US.      Procedure:  L Inj/Asp: L knee on 1/16/2025 7:15 PM  Indications: pain and joint swelling  Details: 22 G needle, ultrasound-guided superolateral approach  Medications: 6 mL lidocaine 10 mg/mL (1 %); 18 mg betamethasone acet,sod phos 6 mg/mL  Outcome: tolerated well, no immediate complications  Procedure, treatment alternatives, risks and benefits explained, specific risks discussed. Consent was given by the patient. Immediately prior to procedure a time out was called to verify the correct patient, procedure, equipment, support staff and site/side marked as required. Patient was prepped and draped in the usual sterile fashion.           Physical Exam:  GENERAL:  No obvious acute distress.  NEURO:  Distally neurovascularly intact.  Sensation intact to light touch.  Extremity: Bilateral knee examination shows:  Skin is intact;  No erythema or warmth;  No edema or ecchymosis;  No effusion;  Can flex the left knee to 130 degrees;  Full extension at 0 degrees;  No pain over the patella;  Negative patellar grind test;  TENDER over the medial joint line overlying the pes anserine insertion;  TENDER over the lateral joint line overlying the distal IT band;  No pain over the patellar or quadricep tendon;  No pain over the proximal tibia;  No pain over the popliteal fossa;  Negative valgus stress test;  Negative varus stress test;  Negative Jimmy's test medially with no instability;  Negative Jimmy's test laterally with no instability;  Negative Lachman's test;  Patellar and quadricep mechanism intact;  Negative anterior and posterior drawer test;  Negative patellar apprehension test;  Distal  pulses are palpable;  Neurovascularly intact; and  Walking with no significant antalgic gait.    Left knee examination shows:  Skin is intact;  No erythema or warmth;  No edema or ecchymosis;  1+ joint effusion;  Can flex the left knee to 110 degrees;  Full extension at 0 degrees;  No pain over the patella;  Negative patellar grind test;  TENDER over the medial joint line;  No pain over the lateral joint line;  No pain over the patellar or quadricep tendon;  No pain over the proximal tibia;  No pain over the popliteal fossa;  Negative valgus stress test;  Negative varus stress test;  Negative Jimmy's test medially with no instability;  Negative Jimmy's test laterally with no instability;  Negative Lachman's test;  Patellar and quadricep mechanism intact;  Negative anterior and posterior drawer test;  Negative patellar apprehension test;  Distal pulses are palpable;  Neurovascularly intact; and  Walking with no significant antalgic gait.    Orders Placed This Encounter    L Inj/Asp    Knee Brace, Hinged    Point of Care Ultrasound      At the conclusion of the visit there were no further questions by the patient/family regarding their plan of care.  Patient was instructed to call or return with any issues, questions, or concerns regarding their injury and/or treatment plan described above.     01/16/25 at 7:16 PM - José Cody,     Office: [phone]    This note was prepared using voice recognition software.  The details of this note are correct and have been reviewed, and corrected to the best of my ability.  Some grammatical errors may persist related to the Dragon software.

## 2025-01-17 ENCOUNTER — TREATMENT (OUTPATIENT)
Dept: PHYSICAL THERAPY | Facility: CLINIC | Age: 70
End: 2025-01-17
Payer: MEDICARE

## 2025-01-17 DIAGNOSIS — M25.562 CHRONIC PAIN OF BOTH KNEES: Primary | ICD-10-CM

## 2025-01-17 DIAGNOSIS — M76.891 PES ANSERINUS TENDINITIS OF BOTH LOWER EXTREMITIES: ICD-10-CM

## 2025-01-17 DIAGNOSIS — M76.892 PES ANSERINUS TENDINITIS OF BOTH LOWER EXTREMITIES: ICD-10-CM

## 2025-01-17 DIAGNOSIS — M25.561 CHRONIC PAIN OF BOTH KNEES: Primary | ICD-10-CM

## 2025-01-17 DIAGNOSIS — M76.32 BILATERAL ILIOTIBIAL BAND TENDINITIS: ICD-10-CM

## 2025-01-17 DIAGNOSIS — G89.29 CHRONIC PAIN OF BOTH KNEES: Primary | ICD-10-CM

## 2025-01-17 DIAGNOSIS — M17.0 OSTEOARTHRITIS OF BOTH KNEES, UNSPECIFIED OSTEOARTHRITIS TYPE: ICD-10-CM

## 2025-01-17 DIAGNOSIS — M76.31 BILATERAL ILIOTIBIAL BAND TENDINITIS: ICD-10-CM

## 2025-01-17 LAB
ANA PATTERN: ABNORMAL
ANA SER QL HEP2 SUBST: POSITIVE
ANA TITR SER IF: ABNORMAL {TITER}
CENTROMERE B AB SER-ACNC: <0.2 AI
CHROMATIN AB SERPL-ACNC: <0.2 AI
DSDNA AB SER-ACNC: <1 IU/ML
ENA JO1 AB SER QL IA: <0.2 AI
ENA RNP AB SER IA-ACNC: 0.3 AI
ENA SCL70 AB SER QL IA: <0.2 AI
ENA SM AB SER IA-ACNC: <0.2 AI
ENA SM+RNP AB SER QL IA: <0.2 AI
ENA SS-A AB SER IA-ACNC: <0.2 AI
ENA SS-B AB SER IA-ACNC: <0.2 AI
RIBOSOMAL P AB SER-ACNC: <0.2 AI

## 2025-01-17 PROCEDURE — 97110 THERAPEUTIC EXERCISES: CPT | Mod: GP | Performed by: PHYSICAL THERAPIST

## 2025-01-17 ASSESSMENT — PAIN - FUNCTIONAL ASSESSMENT: PAIN_FUNCTIONAL_ASSESSMENT: 0-10

## 2025-01-17 ASSESSMENT — PAIN SCALES - GENERAL: PAINLEVEL_OUTOF10: 2

## 2025-01-21 ENCOUNTER — HOSPITAL ENCOUNTER (OUTPATIENT)
Dept: CARDIOLOGY | Facility: HOSPITAL | Age: 70
Discharge: HOME | End: 2025-01-21
Payer: MEDICARE

## 2025-01-21 DIAGNOSIS — R94.31 ABNORMAL ECG: ICD-10-CM

## 2025-01-21 DIAGNOSIS — R76.8 ANA POSITIVE: Primary | ICD-10-CM

## 2025-01-21 LAB
AORTIC VALVE MEAN GRADIENT: 5 MMHG
AORTIC VALVE PEAK VELOCITY: 1.53 M/S
AV PEAK GRADIENT: 9 MMHG
AVA (PEAK VEL): 2.53 CM2
AVA (VTI): 2.25 CM2
EJECTION FRACTION APICAL 4 CHAMBER: 62.1
EJECTION FRACTION: 60 %
LEFT ATRIUM VOLUME AREA LENGTH INDEX BSA: 36.6 ML/M2
LEFT VENTRICLE INTERNAL DIMENSION DIASTOLE: 5.05 CM (ref 3.5–6)
LEFT VENTRICULAR OUTFLOW TRACT DIAMETER: 2 CM
LV EJECTION FRACTION BIPLANE: 59 %
MITRAL VALVE E/A RATIO: 1.65
MITRAL VALVE E/E' RATIO: 12.3
RIGHT VENTRICLE FREE WALL PEAK S': 16.9 CM/S
RIGHT VENTRICLE PEAK SYSTOLIC PRESSURE: 27.2 MMHG
TRICUSPID ANNULAR PLANE SYSTOLIC EXCURSION: 2.9 CM

## 2025-01-21 PROCEDURE — 93306 TTE W/DOPPLER COMPLETE: CPT | Performed by: INTERNAL MEDICINE

## 2025-01-21 PROCEDURE — 93306 TTE W/DOPPLER COMPLETE: CPT

## 2025-01-21 NOTE — PROGRESS NOTES
"Physical Therapy Treatment    Patient Name: Skyler Pena  MRN: [id number]  Today's Date: 1/22/2025  Time Calculation  Start Time: 0747  Stop Time: 0831  Time Calculation (min): 44 min     PT Therapeutic Procedures Time Entry  Neuromuscular Re-Education Time Entry: 8  Therapeutic Exercise Time Entry: 33                 Current Problem  1. Bilateral iliotibial band tendinitis  Follow Up In Physical Therapy      2. Pes anserinus tendinitis of both lower extremities  Follow Up In Physical Therapy      3. Osteoarthritis of both knees, unspecified osteoarthritis type  Follow Up In Physical Therapy      4. Chronic pain of both knees            Insurance:  Visit number: 3  MMO MEDICARE BMN PT OT COPAY 30 DED 0,COVERAGE 100 OOP [phone](0) [phone](0)    Approved Visits: NO AUTH REQ     Precautions   none    Subjective   Subjective:   Pt reports his R knee has been bothering him a little more. It feels more sore. Mornings are usually worse.   Pain   R knee 1/10, L knee 0/10    Objective   Treatments:  Ther Ex: BL Knees  JEREMY 5' (seat #13)  Seated HS Stretches 2x30\" BL  LAQ 1# 20x5\" BL  DBL LAQ w/ Ball at Ankles 3x10----  SLR 2x10 BL  VMO SLRs 10x ea  SAQ 2# 2x10 B w/ ball add  Bridges GTB hold 5\" 2x10  Leg over table Quad Strap Stretch 2x30\" BL  Clamshells RTB 2x10 BL  Sit to stands w/o UE 10x  TKE BTB 20x 5\"  OP EDUCATION:   Access Code: [email or web address]  URL: [email or web address]  Date: 01/22/2025  Prepared by: Monika Downs    Exercises  - Standing Terminal Knee Extension with Resistance  - 1 x daily - 7 x weekly - 2 sets - 10 reps - 5 hold      Assessment:    Pt did well with hold times to maximize muscular activation. Included VMO raises for more quad strengthening. Able to use good form with these. Added TB with bridges to also work the abductors. Some hip rotation with clamshells. Introduced TKE with cues to isolate quad contraction. Good overall completion of ex given.    Plan:   Cont to increase hip and quad " strength to improve knee alignment.

## 2025-01-22 ENCOUNTER — APPOINTMENT (OUTPATIENT)
Dept: ORTHOPEDIC SURGERY | Facility: CLINIC | Age: 70
End: 2025-01-22
Payer: MEDICARE

## 2025-01-22 ENCOUNTER — TREATMENT (OUTPATIENT)
Dept: PHYSICAL THERAPY | Facility: CLINIC | Age: 70
End: 2025-01-22
Payer: MEDICARE

## 2025-01-22 DIAGNOSIS — G89.29 CHRONIC PAIN OF BOTH KNEES: ICD-10-CM

## 2025-01-22 DIAGNOSIS — M25.562 CHRONIC PAIN OF BOTH KNEES: ICD-10-CM

## 2025-01-22 DIAGNOSIS — M76.31 BILATERAL ILIOTIBIAL BAND TENDINITIS: Primary | ICD-10-CM

## 2025-01-22 DIAGNOSIS — M76.892 PES ANSERINUS TENDINITIS OF BOTH LOWER EXTREMITIES: ICD-10-CM

## 2025-01-22 DIAGNOSIS — M76.32 BILATERAL ILIOTIBIAL BAND TENDINITIS: Primary | ICD-10-CM

## 2025-01-22 DIAGNOSIS — M76.891 PES ANSERINUS TENDINITIS OF BOTH LOWER EXTREMITIES: ICD-10-CM

## 2025-01-22 DIAGNOSIS — M25.561 CHRONIC PAIN OF BOTH KNEES: ICD-10-CM

## 2025-01-22 DIAGNOSIS — M17.0 OSTEOARTHRITIS OF BOTH KNEES, UNSPECIFIED OSTEOARTHRITIS TYPE: ICD-10-CM

## 2025-01-22 PROCEDURE — 97112 NEUROMUSCULAR REEDUCATION: CPT | Mod: GP,CQ

## 2025-01-22 PROCEDURE — 97110 THERAPEUTIC EXERCISES: CPT | Mod: GP,CQ

## 2025-01-29 ENCOUNTER — APPOINTMENT (OUTPATIENT)
Dept: PHYSICAL THERAPY | Facility: CLINIC | Age: 70
End: 2025-01-29
Payer: MEDICARE

## 2025-02-05 ENCOUNTER — DOCUMENTATION (OUTPATIENT)
Dept: PHYSICAL THERAPY | Facility: CLINIC | Age: 70
End: 2025-02-05
Payer: MEDICARE

## 2025-02-05 ENCOUNTER — TREATMENT (OUTPATIENT)
Dept: PHYSICAL THERAPY | Facility: CLINIC | Age: 70
End: 2025-02-05
Payer: MEDICARE

## 2025-02-05 DIAGNOSIS — M17.0 OSTEOARTHRITIS OF BOTH KNEES, UNSPECIFIED OSTEOARTHRITIS TYPE: ICD-10-CM

## 2025-02-05 DIAGNOSIS — M76.892 PES ANSERINUS TENDINITIS OF BOTH LOWER EXTREMITIES: ICD-10-CM

## 2025-02-05 DIAGNOSIS — M76.31 BILATERAL ILIOTIBIAL BAND TENDINITIS: ICD-10-CM

## 2025-02-05 DIAGNOSIS — M76.891 PES ANSERINUS TENDINITIS OF BOTH LOWER EXTREMITIES: ICD-10-CM

## 2025-02-05 DIAGNOSIS — M76.32 BILATERAL ILIOTIBIAL BAND TENDINITIS: ICD-10-CM

## 2025-02-05 NOTE — PROGRESS NOTES
Physical Therapy Communication    Patient presents for scheduled PT treatment session this date. Patient reports that he saw a rheumatologist and has been feeling pretty good and has been going to [employer] and working out regularly. He reports that he feels confident to continue with his exercises on his own and to continue with his workout plan at the gym. Patient will follow-up if needed. Pt verbalizes understanding is in agreement with this plan.

## 2025-02-06 ENCOUNTER — OFFICE VISIT (OUTPATIENT)
Dept: ORTHOPEDIC SURGERY | Facility: CLINIC | Age: 70
End: 2025-02-06
Payer: MEDICARE

## 2025-02-06 DIAGNOSIS — M76.892 PES ANSERINUS TENDINITIS OF BOTH LOWER EXTREMITIES: ICD-10-CM

## 2025-02-06 DIAGNOSIS — M76.31 BILATERAL ILIOTIBIAL BAND TENDINITIS: ICD-10-CM

## 2025-02-06 DIAGNOSIS — M76.32 BILATERAL ILIOTIBIAL BAND TENDINITIS: ICD-10-CM

## 2025-02-06 DIAGNOSIS — M71.22 BAKER'S CYST OF KNEE, LEFT: ICD-10-CM

## 2025-02-06 DIAGNOSIS — M17.0 OSTEOARTHRITIS OF BOTH KNEES, UNSPECIFIED OSTEOARTHRITIS TYPE: Primary | ICD-10-CM

## 2025-02-06 DIAGNOSIS — M76.891 PES ANSERINUS TENDINITIS OF BOTH LOWER EXTREMITIES: ICD-10-CM

## 2025-02-06 PROCEDURE — 99214 OFFICE O/P EST MOD 30 MIN: CPT | Performed by: STUDENT IN AN ORGANIZED HEALTH CARE EDUCATION/TRAINING PROGRAM

## 2025-02-06 PROCEDURE — 99213 OFFICE O/P EST LOW 20 MIN: CPT | Performed by: STUDENT IN AN ORGANIZED HEALTH CARE EDUCATION/TRAINING PROGRAM

## 2025-02-06 PROCEDURE — 1123F ACP DISCUSS/DSCN MKR DOCD: CPT | Performed by: STUDENT IN AN ORGANIZED HEALTH CARE EDUCATION/TRAINING PROGRAM

## 2025-02-06 NOTE — PROGRESS NOTES
Acute Injury New Patient Visit    HPI: Skyler is a 69 y.o.male who presents today for follow-up of his bilateral IT band tendinitis, bilateral pes anserine tendinitis, bilateral mild OA of the knees and is status post left knee injection on 1/16/2025.  States that his left knee is approximately 80% better.  He was also seen by rheumatology and started on hydroxychloroquine and methotrexate this past Friday.  He states that he feels a little better on this.  He does still complain of a Baker's cyst that physical therapy thinks may be generating his pain.    On 1/16/2025, he presented for follow-up of his bilateral IT band tendinitis, bilateral pes anserine tendinitis, bilateral mild OA of the knees as well as a concern for rheumatologic cause of his polyarthralgias.  Unfortunately, he began having severe pain over the left knee, after having an initial improvement on the Medrol Dosepak.  He went back on the naproxen, which seems to help a little bit.  Unfortunately, yesterday, he had 10 out of 10 pain.  He has swelling about the knee.  He has a Baker's cyst.  He denies any falls or injuries.    On 1/6/2025, he presented with new complaints of Bilateral knee pain.  The left is worse than the right.  He denies any falls or injuries.  Pain began in late November.  He had swelling into the low left calf after a trip for [employer], and was seen at the ED on 12/27/2024, and found to not have any evidence of acute DVT on duplex ultrasound of the left lower extremity.  The pain is anterior medial.  He is also having hip, shoulder, and wrist pain.  Back in November he started using the stationary bike.  Ibuprofen helped a little bit.  He was placed on naproxen from his PCP.  PCP also ordered a workup for rheumatologic causes.    Plan: Today, on 2/6/2025, we will have him continue with physical therapy, and follow-up in 4 weeks.  If he is not better at that time, I would consider aspiration of his Baker's cyst.  Continue with  his treatment with rheumatology as well.    On 1/16/2025, provided him with a corticosteroid injection of the left knee which tolerated well without complications.  Postinjection instructions given.  Also fitted him with a free sport knee brace.  Continue other conservative treat measures as discussed at prior visit.  Follow-up in 4 weeks.    On 1/6/2025, for this bilateral IT band tendinitis, bilateral pes anserine tendinitis, bilateral mild OA of the knees, as well as a concern for a rheumatologic cause of his polyarthralgias, we will start him on a Medrol Dosepak, pauses NSAIDs while he is taking the Medrol, start physical therapy, follow-up with the PCP for workup of his rheumatologic blood work, and follow-up in 4 to 5 weeks see how he is doing.    Assessment:   Problem List Items Addressed This Visit    None  Visit Diagnoses       Osteoarthritis of both knees, unspecified osteoarthritis type    -  Primary    Baker's cyst of knee, left        Pes anserinus tendinitis of both lower extremities        Bilateral iliotibial band tendinitis                  Diagnostics: Reviewed all relevant imaging including x-ray, MRI, CT, and US.      Procedure:  Procedures    Physical Exam:  GENERAL:  No obvious acute distress.  NEURO:  Distally neurovascularly intact.  Sensation intact to light touch.  Extremity: Bilateral knee examination shows:  Skin is intact;  No erythema or warmth;  No edema or ecchymosis;  No effusion;  Can flex the left knee to 130 degrees;  Full extension at 0 degrees;  No pain over the patella;  Negative patellar grind test;  TENDER over the medial joint line overlying the pes anserine insertion;  TENDER over the lateral joint line overlying the distal IT band;  No pain over the patellar or quadricep tendon;  No pain over the proximal tibia;  No pain over the popliteal fossa;  Negative valgus stress test;  Negative varus stress test;  Negative Jimmy's test medially with no instability;  Negative  Jimmy's test laterally with no instability;  Negative Lachman's test;  Patellar and quadricep mechanism intact;  Negative anterior and posterior drawer test;  Negative patellar apprehension test;  Distal pulses are palpable;  Neurovascularly intact; and  Walking with no significant antalgic gait.    Left knee examination shows:  Skin is intact;  No erythema or warmth;  No edema or ecchymosis;  1+ joint effusion;  Can flex the left knee to 110 degrees;  Full extension at 0 degrees;  No pain over the patella;  Negative patellar grind test;  TENDER over the medial joint line;  No pain over the lateral joint line;  No pain over the patellar or quadricep tendon;  No pain over the proximal tibia;  No pain over the popliteal fossa;  Negative valgus stress test;  Negative varus stress test;  Negative Jimmy's test medially with no instability;  Negative Jimmy's test laterally with no instability;  Negative Lachman's test;  Patellar and quadricep mechanism intact;  Negative anterior and posterior drawer test;  Negative patellar apprehension test;  Distal pulses are palpable;  Neurovascularly intact; and  Walking with no significant antalgic gait.    No orders of the defined types were placed in this encounter.     At the conclusion of the visit there were no further questions by the patient/family regarding their plan of care.  Patient was instructed to call or return with any issues, questions, or concerns regarding their injury and/or treatment plan described above.     02/06/25 at 6:00 PM - José Cody DO    Office: [phone]    This note was prepared using voice recognition software.  The details of this note are correct and have been reviewed, and corrected to the best of my ability.  Some grammatical errors may persist related to the Dragon software.

## 2025-02-12 ENCOUNTER — APPOINTMENT (OUTPATIENT)
Dept: PHYSICAL THERAPY | Facility: CLINIC | Age: 70
End: 2025-02-12
Payer: MEDICARE

## 2025-02-20 ENCOUNTER — OFFICE VISIT (OUTPATIENT)
Dept: ORTHOPEDIC SURGERY | Facility: CLINIC | Age: 70
End: 2025-02-20
Payer: MEDICARE

## 2025-02-20 DIAGNOSIS — M25.562 ACUTE PAIN OF BOTH KNEES: Primary | ICD-10-CM

## 2025-02-20 DIAGNOSIS — M25.561 ACUTE PAIN OF BOTH KNEES: Primary | ICD-10-CM

## 2025-02-20 PROCEDURE — 1123F ACP DISCUSS/DSCN MKR DOCD: CPT | Performed by: STUDENT IN AN ORGANIZED HEALTH CARE EDUCATION/TRAINING PROGRAM

## 2025-02-20 PROCEDURE — 99213 OFFICE O/P EST LOW 20 MIN: CPT | Performed by: STUDENT IN AN ORGANIZED HEALTH CARE EDUCATION/TRAINING PROGRAM

## 2025-02-20 NOTE — PROGRESS NOTES
Acute Injury New Patient Visit    HPI: Skyler is a 69 y.o.male who presents today for follow-up of his bilateral IT band tendinitis, bilateral pes planus anserine tendinitis, bilateral mild OA of the knees.  States that both of his knees have no pain whatsoever.  He is doing great.  Started on his rheumatological medications and is doing well on them.  He does occasionally have some morning stiffness in the bilateral hands.  He is also having some numbness and tingling into the right first 3 fingers.  He denies any falls or injuries.    On 2/6/2025, he presented for follow-up of his bilateral IT band tendinitis, bilateral pes anserine tendinitis, bilateral mild OA of the knees and is status post left knee injection on 1/16/2025.  States that his left knee is approximately 80% better.  He was also seen by rheumatology and started on hydroxychloroquine and methotrexate this past Friday.  He states that he feels a little better on this.  He does still complain of a Baker's cyst that physical therapy thinks may be generating his pain.    On 1/16/2025, he presented for follow-up of his bilateral IT band tendinitis, bilateral pes anserine tendinitis, bilateral mild OA of the knees as well as a concern for rheumatologic cause of his polyarthralgias.  Unfortunately, he began having severe pain over the left knee, after having an initial improvement on the Medrol Dosepak.  He went back on the naproxen, which seems to help a little bit.  Unfortunately, yesterday, he had 10 out of 10 pain.  He has swelling about the knee.  He has a Baker's cyst.  He denies any falls or injuries.    On 1/6/2025, he presented with new complaints of Bilateral knee pain.  The left is worse than the right.  He denies any falls or injuries.  Pain began in late November.  He had swelling into the low left calf after a trip for [place], and was seen at the ED on 12/27/2024, and found to not have any evidence of acute DVT on duplex ultrasound of the  left lower extremity.  The pain is anterior medial.  He is also having hip, shoulder, and wrist pain.  Back in November he started using the stationary bike.  Ibuprofen helped a little bit.  He was placed on naproxen from his PCP.  PCP also ordered a workup for rheumatologic causes.    Plan: Today, on 2/20/2025, we will have him follow-up as needed for his knees, happy to do another injection as needed going forward if there is at least 3 to 4 months in the interval, and for his bilateral hand morning stiffness and pain, we discussed that this is likely related to his rheumatological issues, but he may have a component of carpal tunnel syndrome as well.  Gave him some information for one of our hand surgeons follow-up as needed.  For the knees, follow-up as needed.    On 2/6/2025, we will have him continue with physical therapy, and follow-up in 4 weeks.  If he is not better at that time, I would consider aspiration of his Baker's cyst.  Continue with his treatment with rheumatology as well.    On 1/16/2025, provided him with a corticosteroid injection of the left knee which tolerated well without complications.  Postinjection instructions given.  Also fitted him with a free sport knee brace.  Continue other conservative treat measures as discussed at prior visit.  Follow-up in 4 weeks.    On 1/6/2025, for this bilateral IT band tendinitis, bilateral pes anserine tendinitis, bilateral mild OA of the knees, as well as a concern for a rheumatologic cause of his polyarthralgias, we will start him on a Medrol Dosepak, pauses NSAIDs while he is taking the Medrol, start physical therapy, follow-up with the PCP for workup of his rheumatologic blood work, and follow-up in 4 to 5 weeks see how he is doing.    Assessment:   Problem List Items Addressed This Visit       Knee pain, bilateral - Primary           Diagnostics: Reviewed all relevant imaging including x-ray, MRI, CT, and US.      Procedure:  Procedures    Physical  Exam:  GENERAL:  No obvious acute distress.  NEURO:  Distally neurovascularly intact.  Sensation intact to light touch.  Extremity: Bilateral knee examination shows:  Skin is intact;  No erythema or warmth;  No edema or ecchymosis;  No effusion;  Can flex the left knee to 130 degrees;  Full extension at 0 degrees;  No pain over the patella;  Negative patellar grind test;  TENDER over the medial joint line overlying the pes anserine insertion;  TENDER over the lateral joint line overlying the distal IT band;  No pain over the patellar or quadricep tendon;  No pain over the proximal tibia;  No pain over the popliteal fossa;  Negative valgus stress test;  Negative varus stress test;  Negative Jimmy's test medially with no instability;  Negative Jimmy's test laterally with no instability;  Negative Lachman's test;  Patellar and quadricep mechanism intact;  Negative anterior and posterior drawer test;  Negative patellar apprehension test;  Distal pulses are palpable;  Neurovascularly intact; and  Walking with no significant antalgic gait.    Left knee examination shows:  Skin is intact;  No erythema or warmth;  No edema or ecchymosis;  1+ joint effusion;  Can flex the left knee to 110 degrees;  Full extension at 0 degrees;  No pain over the patella;  Negative patellar grind test;  TENDER over the medial joint line;  No pain over the lateral joint line;  No pain over the patellar or quadricep tendon;  No pain over the proximal tibia;  No pain over the popliteal fossa;  Negative valgus stress test;  Negative varus stress test;  Negative Jimmy's test medially with no instability;  Negative Jimmy's test laterally with no instability;  Negative Lachman's test;  Patellar and quadricep mechanism intact;  Negative anterior and posterior drawer test;  Negative patellar apprehension test;  Distal pulses are palpable;  Neurovascularly intact; and  Walking with no significant antalgic gait.    No orders of the defined  types were placed in this encounter.     At the conclusion of the visit there were no further questions by the patient/family regarding their plan of care.  Patient was instructed to call or return with any issues, questions, or concerns regarding their injury and/or treatment plan described above.     02/20/25 at 6:21 PM - José Cody DO    Office: [phone]    This note was prepared using voice recognition software.  The details of this note are correct and have been reviewed, and corrected to the best of my ability.  Some grammatical errors may persist related to the Dragon software.

## 2025-02-24 ENCOUNTER — TELEPHONE (OUTPATIENT)
Dept: PRIMARY CARE | Facility: CLINIC | Age: 70
End: 2025-02-24
Payer: MEDICARE

## 2025-02-24 DIAGNOSIS — I10 PRIMARY HYPERTENSION: Primary | ICD-10-CM

## 2025-02-24 NOTE — TELEPHONE ENCOUNTER
Patient called in stating he has had a persistent cough since starting lisinopril. He read this is one of the possible side effects and is wondering if Dr. Caban could switch him to a different medication?  Solvonics   Please Advise

## 2025-02-25 RX ORDER — LOSARTAN POTASSIUM 25 MG/1
25 TABLET ORAL DAILY
Qty: 30 TABLET | Refills: 3 | Status: SHIPPED | OUTPATIENT
Start: 2025-02-25

## 2025-03-05 ENCOUNTER — APPOINTMENT (OUTPATIENT)
Dept: PHYSICAL THERAPY | Facility: CLINIC | Age: 70
End: 2025-03-05
Payer: MEDICARE

## 2025-03-06 ENCOUNTER — APPOINTMENT (OUTPATIENT)
Dept: ORTHOPEDIC SURGERY | Facility: CLINIC | Age: 70
End: 2025-03-06
Payer: MEDICARE

## 2025-03-25 DIAGNOSIS — R73.01 ELEVATED FASTING BLOOD SUGAR: ICD-10-CM

## 2025-03-25 DIAGNOSIS — I10 PRIMARY HYPERTENSION: ICD-10-CM

## 2025-03-25 DIAGNOSIS — E78.00 PURE HYPERCHOLESTEROLEMIA, UNSPECIFIED: ICD-10-CM

## 2025-03-25 DIAGNOSIS — E03.9 ACQUIRED HYPOTHYROIDISM: ICD-10-CM

## 2025-03-25 LAB
ALBUMIN SERPL-MCNC: 3.7 G/DL (ref 3.6–5.1)
ALP SERPL-CCNC: 68 U/L (ref 35–144)
ALT SERPL-CCNC: 20 U/L (ref 9–46)
ANION GAP SERPL CALCULATED.4IONS-SCNC: 7 MMOL/L (CALC) (ref 7–17)
AST SERPL-CCNC: 18 U/L (ref 10–35)
BASOPHILS # BLD AUTO: 59 CELLS/UL (ref 0–200)
BASOPHILS NFR BLD AUTO: 1.1 %
BILIRUB SERPL-MCNC: 0.7 MG/DL (ref 0.2–1.2)
BUN SERPL-MCNC: 18 MG/DL (ref 7–25)
CALCIUM SERPL-MCNC: 8.6 MG/DL (ref 8.6–10.3)
CHLORIDE SERPL-SCNC: 105 MMOL/L (ref 98–110)
CHOLEST SERPL-MCNC: 189 MG/DL
CHOLEST/HDLC SERPL: 2.8 (CALC)
CO2 SERPL-SCNC: 26 MMOL/L (ref 20–32)
CREAT SERPL-MCNC: 0.92 MG/DL (ref 0.7–1.28)
EGFRCR SERPLBLD CKD-EPI 2021: 89 ML/MIN/1.73M2
EOSINOPHIL # BLD AUTO: 184 CELLS/UL (ref 15–500)
EOSINOPHIL NFR BLD AUTO: 3.4 %
ERYTHROCYTE [DISTWIDTH] IN BLOOD BY AUTOMATED COUNT: 14 % (ref 11–15)
GLUCOSE SERPL-MCNC: 104 MG/DL (ref 65–99)
HCT VFR BLD AUTO: 43.5 % (ref 38.5–50)
HDLC SERPL-MCNC: 67 MG/DL
HGB BLD-MCNC: 14.2 G/DL (ref 13.2–17.1)
LDLC SERPL CALC-MCNC: 101 MG/DL (CALC)
LYMPHOCYTES # BLD AUTO: 988 CELLS/UL (ref 850–3900)
LYMPHOCYTES NFR BLD AUTO: 18.3 %
MCH RBC QN AUTO: 30.9 PG (ref 27–33)
MCHC RBC AUTO-ENTMCNC: 32.6 G/DL (ref 32–36)
MCV RBC AUTO: 94.6 FL (ref 80–100)
MONOCYTES # BLD AUTO: 443 CELLS/UL (ref 200–950)
MONOCYTES NFR BLD AUTO: 8.2 %
NEUTROPHILS # BLD AUTO: 3726 CELLS/UL (ref 1500–7800)
NEUTROPHILS NFR BLD AUTO: 69 %
NONHDLC SERPL-MCNC: 122 MG/DL (CALC)
PLATELET # BLD AUTO: 237 THOUSAND/UL (ref 140–400)
PMV BLD REES-ECKER: 9.5 FL (ref 7.5–12.5)
POTASSIUM SERPL-SCNC: 4 MMOL/L (ref 3.5–5.3)
PROT SERPL-MCNC: 5.9 G/DL (ref 6.1–8.1)
RBC # BLD AUTO: 4.6 MILLION/UL (ref 4.2–5.8)
SODIUM SERPL-SCNC: 138 MMOL/L (ref 135–146)
TRIGL SERPL-MCNC: 110 MG/DL
WBC # BLD AUTO: 5.4 THOUSAND/UL (ref 3.8–10.8)

## 2025-03-26 LAB
T4 FREE SERPL-MCNC: 1.2 NG/DL (ref 0.8–1.8)
TSH SERPL-ACNC: 4.08 MIU/L (ref 0.4–4.5)

## 2025-04-03 ENCOUNTER — APPOINTMENT (OUTPATIENT)
Dept: PRIMARY CARE | Facility: CLINIC | Age: 70
End: 2025-04-03
Payer: MEDICARE

## 2025-04-03 VITALS
BODY MASS INDEX: 39.35 KG/M2 | HEART RATE: 63 BPM | HEIGHT: 74 IN | WEIGHT: 306.6 LBS | OXYGEN SATURATION: 98 % | SYSTOLIC BLOOD PRESSURE: 160 MMHG | DIASTOLIC BLOOD PRESSURE: 90 MMHG

## 2025-04-03 DIAGNOSIS — M71.22 BAKER'S CYST OF KNEE, LEFT: ICD-10-CM

## 2025-04-03 DIAGNOSIS — I10 PRIMARY HYPERTENSION: Primary | ICD-10-CM

## 2025-04-03 DIAGNOSIS — R97.20 ELEVATED PSA: ICD-10-CM

## 2025-04-03 PROCEDURE — 99214 OFFICE O/P EST MOD 30 MIN: CPT | Performed by: FAMILY MEDICINE

## 2025-04-03 PROCEDURE — 3077F SYST BP >= 140 MM HG: CPT | Performed by: FAMILY MEDICINE

## 2025-04-03 PROCEDURE — 3008F BODY MASS INDEX DOCD: CPT | Performed by: FAMILY MEDICINE

## 2025-04-03 PROCEDURE — 1036F TOBACCO NON-USER: CPT | Performed by: FAMILY MEDICINE

## 2025-04-03 PROCEDURE — 1123F ACP DISCUSS/DSCN MKR DOCD: CPT | Performed by: FAMILY MEDICINE

## 2025-04-03 PROCEDURE — 1160F RVW MEDS BY RX/DR IN RCRD: CPT | Performed by: FAMILY MEDICINE

## 2025-04-03 PROCEDURE — 1159F MED LIST DOCD IN RCRD: CPT | Performed by: FAMILY MEDICINE

## 2025-04-03 PROCEDURE — 3079F DIAST BP 80-89 MM HG: CPT | Performed by: FAMILY MEDICINE

## 2025-04-03 PROCEDURE — G2211 COMPLEX E/M VISIT ADD ON: HCPCS | Performed by: FAMILY MEDICINE

## 2025-04-03 RX ORDER — AMLODIPINE BESYLATE 5 MG/1
5 TABLET ORAL DAILY
Qty: 90 TABLET | Refills: 3 | Status: SHIPPED | OUTPATIENT
Start: 2025-04-03 | End: 2026-03-29

## 2025-04-03 NOTE — PROGRESS NOTES
"Subjective   Patient ID:  Skyler Pena is a 69 y.o. male patient who presents today for Follow-up, Cough, Hypertension, Hyperlipidemia (/), Thyroid Problem, and obstructive uropathy.    Cough: Patient continues to have a cough even after changing BP medications. Continues to experience constant tickling sensation at the back of his throat. Patient is to discontinue losartan.     Hypertension: Blood pressure is high today. Blood pressure was higher on Monday around 184/93. Will prescribe amlodipine 5 mg daily.     Hyperlipidemia: Blood work shows high LDL cholesterol     Thyroid problem: Stable with TSH 4.08 and FT4 1.2.     Obstructive uropathy: Blood work from 3 months ago shows PSA level of 10.01. Will check PSA at next visit.     Patient reports lower back issues for which he follows with  his chiropractor. When exercising, particularly when performing knee hugs, he hears clicking over his spine.     Prediabetes: Blood work shows blood glucose of 104 mg/dl. He has reduced alcohol intake and started following a more ketogenic diet. He has started going on more walks. Recommended to slowly ease back into regular exercise.       Objective   Vitals:  /90   Pulse 63   Ht 1.88 m (6' 2\")   Wt 139 kg (306 lb 9.6 oz)   SpO2 98%   BMI 39.37 kg/m²       Physical Exam  Vitals reviewed.   Constitutional:       Appearance: Normal appearance.   Neck:      Vascular: No carotid bruit.   Cardiovascular:      Rate and Rhythm: Normal rate and regular rhythm.      Pulses: Normal pulses.      Heart sounds: Normal heart sounds.   Pulmonary:      Effort: Pulmonary effort is normal. No respiratory distress.      Breath sounds: Normal breath sounds. No wheezing.   Abdominal:      General: There is no distension.      Palpations: Abdomen is soft. There is no mass.      Tenderness: There is no abdominal tenderness. There is no right CVA tenderness, left CVA tenderness, guarding or rebound.   Musculoskeletal:      Cervical back: " Normal range of motion and neck supple. No rigidity.      Right lower leg: No edema.      Left lower leg: No edema.   Lymphadenopathy:      Cervical: No cervical adenopathy.   Neurological:      Mental Status: He is alert.         Labs reviewed from:  3/25/25     CMP, CBC, Lipid      Assessment/Plan   Problem List Items Addressed This Visit       Elevated PSA    Current Assessment & Plan     Further elevated, 10.01.  Offered MRI prostate patient would prefer to recheck the PSA next lab draw and then we consider imaging or not.         Relevant Orders    Testosterone    Estrogens, Total    PSA, total and free    Primary hypertension - Primary    Current Assessment & Plan      This condition is poorly controlled, therapeutic changes necessary.  Discontinue losartan and start amlodipine 5 mg daily.         Relevant Medications    amLODIPine (Norvasc) 5 mg tablet    Other Relevant Orders    CBC and Auto Differential    Comprehensive Metabolic Panel    Magnesium    Follow Up In [facility] - PCP - Established     Other Visit Diagnoses       Baker's cyst of knee, left        Symptomatic, treat.            Follow up in: 4 month(s) for follow-up of the above issues or sooner if needed with labs prior.       Scribe Attestation  By signing my name below, IMary , Scribe attest that this documentation has been prepared under the direction and in the presence of Fernando Caban MD.  IFernando MD, personally performed the services described in the documentation as scribed by Mary Mcbride in my presence and confirm that it is both accurate and complete.

## 2025-04-03 NOTE — ASSESSMENT & PLAN NOTE
Further elevated, 10.01.  Offered MRI prostate patient would prefer to recheck the PSA next lab draw and then we consider imaging or not.

## 2025-04-03 NOTE — ASSESSMENT & PLAN NOTE
This condition is poorly controlled, therapeutic changes necessary.  Discontinue losartan and start amlodipine 5 mg daily.

## 2025-04-11 ENCOUNTER — APPOINTMENT (OUTPATIENT)
Dept: CARDIOLOGY | Facility: HOSPITAL | Age: 70
End: 2025-04-11
Payer: MEDICARE

## 2025-04-11 ENCOUNTER — APPOINTMENT (OUTPATIENT)
Dept: RADIOLOGY | Facility: HOSPITAL | Age: 70
End: 2025-04-11
Payer: MEDICARE

## 2025-04-11 ENCOUNTER — HOSPITAL ENCOUNTER (EMERGENCY)
Facility: HOSPITAL | Age: 70
Discharge: HOME | End: 2025-04-12
Attending: EMERGENCY MEDICINE
Payer: MEDICARE

## 2025-04-11 DIAGNOSIS — R41.0 TRANSIENT CONFUSION: ICD-10-CM

## 2025-04-11 DIAGNOSIS — R00.1 SYMPTOMATIC BRADYCARDIA: ICD-10-CM

## 2025-04-11 DIAGNOSIS — E87.6 HYPOKALEMIA: ICD-10-CM

## 2025-04-11 DIAGNOSIS — E03.9 HYPOTHYROIDISM, UNSPECIFIED TYPE: ICD-10-CM

## 2025-04-11 DIAGNOSIS — R55 NEAR SYNCOPE: Primary | ICD-10-CM

## 2025-04-11 LAB
ALBUMIN SERPL BCP-MCNC: 3.8 G/DL (ref 3.4–5)
ALP SERPL-CCNC: 59 U/L (ref 33–136)
ALT SERPL W P-5'-P-CCNC: 20 U/L (ref 10–52)
ANION GAP SERPL CALC-SCNC: 9 MMOL/L (ref 10–20)
APPEARANCE UR: CLEAR
AST SERPL W P-5'-P-CCNC: 18 U/L (ref 9–39)
BASOPHILS # BLD AUTO: 0.05 X10*3/UL (ref 0–0.1)
BASOPHILS NFR BLD AUTO: 0.9 %
BILIRUB SERPL-MCNC: 0.4 MG/DL (ref 0–1.2)
BILIRUB UR STRIP.AUTO-MCNC: NEGATIVE MG/DL
BNP SERPL-MCNC: 36 PG/ML (ref 0–99)
BUN SERPL-MCNC: 19 MG/DL (ref 6–23)
CALCIUM SERPL-MCNC: 8.3 MG/DL (ref 8.6–10.3)
CARDIAC TROPONIN I PNL SERPL HS: 13 NG/L (ref 0–20)
CARDIAC TROPONIN I PNL SERPL HS: 14 NG/L (ref 0–20)
CHLORIDE SERPL-SCNC: 104 MMOL/L (ref 98–107)
CO2 SERPL-SCNC: 26 MMOL/L (ref 21–32)
COLOR UR: YELLOW
CREAT SERPL-MCNC: 1.17 MG/DL (ref 0.5–1.3)
D DIMER PPP FEU-MCNC: 813 NG/ML FEU
EGFRCR SERPLBLD CKD-EPI 2021: 67 ML/MIN/1.73M*2
EOSINOPHIL # BLD AUTO: 0.1 X10*3/UL (ref 0–0.7)
EOSINOPHIL NFR BLD AUTO: 1.7 %
ERYTHROCYTE [DISTWIDTH] IN BLOOD BY AUTOMATED COUNT: 14.2 % (ref 11.5–14.5)
ETHANOL SERPL-MCNC: 78 MG/DL
GLUCOSE BLD MANUAL STRIP-MCNC: 118 MG/DL (ref 74–99)
GLUCOSE SERPL-MCNC: 107 MG/DL (ref 74–99)
GLUCOSE UR STRIP.AUTO-MCNC: NORMAL MG/DL
GRAN CASTS #/AREA UR COMP ASSIST: ABNORMAL /LPF
HCT VFR BLD AUTO: 39.3 % (ref 41–52)
HGB BLD-MCNC: 13.1 G/DL (ref 13.5–17.5)
HYALINE CASTS #/AREA URNS AUTO: ABNORMAL /LPF
IMM GRANULOCYTES # BLD AUTO: 0.04 X10*3/UL (ref 0–0.7)
IMM GRANULOCYTES NFR BLD AUTO: 0.7 % (ref 0–0.9)
KETONES UR STRIP.AUTO-MCNC: NEGATIVE MG/DL
LEUKOCYTE ESTERASE UR QL STRIP.AUTO: NEGATIVE
LYMPHOCYTES # BLD AUTO: 0.76 X10*3/UL (ref 1.2–4.8)
LYMPHOCYTES NFR BLD AUTO: 12.9 %
MAGNESIUM SERPL-MCNC: 2.16 MG/DL (ref 1.6–2.4)
MCH RBC QN AUTO: 31 PG (ref 26–34)
MCHC RBC AUTO-ENTMCNC: 33.3 G/DL (ref 32–36)
MCV RBC AUTO: 93 FL (ref 80–100)
MONOCYTES # BLD AUTO: 0.4 X10*3/UL (ref 0.1–1)
MONOCYTES NFR BLD AUTO: 6.8 %
MUCOUS THREADS #/AREA URNS AUTO: ABNORMAL /LPF
NEUTROPHILS # BLD AUTO: 4.53 X10*3/UL (ref 1.2–7.7)
NEUTROPHILS NFR BLD AUTO: 77 %
NITRITE UR QL STRIP.AUTO: NEGATIVE
NRBC BLD-RTO: 0 /100 WBCS (ref 0–0)
PH UR STRIP.AUTO: 5.5 [PH]
PLATELET # BLD AUTO: 228 X10*3/UL (ref 150–450)
POTASSIUM SERPL-SCNC: 3.4 MMOL/L (ref 3.5–5.3)
PROT SERPL-MCNC: 6.1 G/DL (ref 6.4–8.2)
PROT UR STRIP.AUTO-MCNC: ABNORMAL MG/DL
RBC # BLD AUTO: 4.22 X10*6/UL (ref 4.5–5.9)
RBC # UR STRIP.AUTO: NEGATIVE MG/DL
RBC #/AREA URNS AUTO: ABNORMAL /HPF
SODIUM SERPL-SCNC: 136 MMOL/L (ref 136–145)
SP GR UR STRIP.AUTO: 1.01
T4 FREE SERPL-MCNC: 1.27 NG/DL (ref 0.61–1.12)
TSH SERPL-ACNC: 5.94 MIU/L (ref 0.44–3.98)
UROBILINOGEN UR STRIP.AUTO-MCNC: NORMAL MG/DL
WBC # BLD AUTO: 5.9 X10*3/UL (ref 4.4–11.3)
WBC #/AREA URNS AUTO: ABNORMAL /HPF

## 2025-04-11 PROCEDURE — 84484 ASSAY OF TROPONIN QUANT: CPT | Performed by: PHYSICIAN ASSISTANT

## 2025-04-11 PROCEDURE — 71275 CT ANGIOGRAPHY CHEST: CPT | Performed by: RADIOLOGY

## 2025-04-11 PROCEDURE — 71045 X-RAY EXAM CHEST 1 VIEW: CPT | Performed by: RADIOLOGY

## 2025-04-11 PROCEDURE — 84443 ASSAY THYROID STIM HORMONE: CPT | Performed by: PHYSICIAN ASSISTANT

## 2025-04-11 PROCEDURE — 96360 HYDRATION IV INFUSION INIT: CPT | Mod: 59

## 2025-04-11 PROCEDURE — 36415 COLL VENOUS BLD VENIPUNCTURE: CPT | Performed by: PHYSICIAN ASSISTANT

## 2025-04-11 PROCEDURE — 81001 URINALYSIS AUTO W/SCOPE: CPT | Performed by: PHYSICIAN ASSISTANT

## 2025-04-11 PROCEDURE — 82947 ASSAY GLUCOSE BLOOD QUANT: CPT | Mod: 59

## 2025-04-11 PROCEDURE — 82077 ASSAY SPEC XCP UR&BREATH IA: CPT | Performed by: PHYSICIAN ASSISTANT

## 2025-04-11 PROCEDURE — 99285 EMERGENCY DEPT VISIT HI MDM: CPT | Mod: 25 | Performed by: EMERGENCY MEDICINE

## 2025-04-11 PROCEDURE — 2550000001 HC RX 255 CONTRASTS: Performed by: EMERGENCY MEDICINE

## 2025-04-11 PROCEDURE — 70450 CT HEAD/BRAIN W/O DYE: CPT

## 2025-04-11 PROCEDURE — 2500000004 HC RX 250 GENERAL PHARMACY W/ HCPCS (ALT 636 FOR OP/ED): Performed by: PHYSICIAN ASSISTANT

## 2025-04-11 PROCEDURE — 85025 COMPLETE CBC W/AUTO DIFF WBC: CPT | Performed by: PHYSICIAN ASSISTANT

## 2025-04-11 PROCEDURE — 83735 ASSAY OF MAGNESIUM: CPT | Performed by: PHYSICIAN ASSISTANT

## 2025-04-11 PROCEDURE — 2500000002 HC RX 250 W HCPCS SELF ADMINISTERED DRUGS (ALT 637 FOR MEDICARE OP, ALT 636 FOR OP/ED): Performed by: PHYSICIAN ASSISTANT

## 2025-04-11 PROCEDURE — 70450 CT HEAD/BRAIN W/O DYE: CPT | Performed by: RADIOLOGY

## 2025-04-11 PROCEDURE — 93005 ELECTROCARDIOGRAM TRACING: CPT

## 2025-04-11 PROCEDURE — 82947 ASSAY GLUCOSE BLOOD QUANT: CPT

## 2025-04-11 PROCEDURE — 84439 ASSAY OF FREE THYROXINE: CPT | Performed by: PHYSICIAN ASSISTANT

## 2025-04-11 PROCEDURE — 85379 FIBRIN DEGRADATION QUANT: CPT | Performed by: PHYSICIAN ASSISTANT

## 2025-04-11 PROCEDURE — 71045 X-RAY EXAM CHEST 1 VIEW: CPT

## 2025-04-11 PROCEDURE — 80053 COMPREHEN METABOLIC PANEL: CPT | Performed by: PHYSICIAN ASSISTANT

## 2025-04-11 PROCEDURE — 83880 ASSAY OF NATRIURETIC PEPTIDE: CPT | Performed by: PHYSICIAN ASSISTANT

## 2025-04-11 PROCEDURE — 71275 CT ANGIOGRAPHY CHEST: CPT

## 2025-04-11 RX ORDER — POTASSIUM CHLORIDE 20 MEQ/1
40 TABLET, EXTENDED RELEASE ORAL ONCE
Status: COMPLETED | OUTPATIENT
Start: 2025-04-11 | End: 2025-04-11

## 2025-04-11 RX ADMIN — IOHEXOL 75 ML: 350 INJECTION, SOLUTION INTRAVENOUS at 22:29

## 2025-04-11 RX ADMIN — POTASSIUM CHLORIDE 40 MEQ: 1500 TABLET, EXTENDED RELEASE ORAL at 22:52

## 2025-04-11 RX ADMIN — SODIUM CHLORIDE, SODIUM LACTATE, POTASSIUM CHLORIDE, AND CALCIUM CHLORIDE 1000 ML: .6; .31; .03; .02 INJECTION, SOLUTION INTRAVENOUS at 20:56

## 2025-04-11 ASSESSMENT — PAIN DESCRIPTION - LOCATION
LOCATION: KNEE
LOCATION_2: WRIST

## 2025-04-11 ASSESSMENT — PAIN DESCRIPTION - ORIENTATION
ORIENTATION: RIGHT
ORIENTATION_2: LEFT

## 2025-04-11 ASSESSMENT — PAIN SCALES - GENERAL
PAINLEVEL_OUTOF10: 0 - NO PAIN
PAINLEVEL_OUTOF10: 2
PAINLEVEL_OUTOF10: 0 - NO PAIN
PAINLEVEL_OUTOF10: 0 - NO PAIN
PAINLEVEL_OUTOF10: 2
PAINLEVEL_OUTOF10: 0 - NO PAIN

## 2025-04-11 ASSESSMENT — LIFESTYLE VARIABLES
TOTAL SCORE: 0
EVER HAD A DRINK FIRST THING IN THE MORNING TO STEADY YOUR NERVES TO GET RID OF A HANGOVER: NO
HAVE YOU EVER FELT YOU SHOULD CUT DOWN ON YOUR DRINKING: NO
EVER FELT BAD OR GUILTY ABOUT YOUR DRINKING: NO
HAVE PEOPLE ANNOYED YOU BY CRITICIZING YOUR DRINKING: NO

## 2025-04-11 ASSESSMENT — COLUMBIA-SUICIDE SEVERITY RATING SCALE - C-SSRS
2. HAVE YOU ACTUALLY HAD ANY THOUGHTS OF KILLING YOURSELF?: NO
6. HAVE YOU EVER DONE ANYTHING, STARTED TO DO ANYTHING, OR PREPARED TO DO ANYTHING TO END YOUR LIFE?: NO
1. IN THE PAST MONTH, HAVE YOU WISHED YOU WERE DEAD OR WISHED YOU COULD GO TO SLEEP AND NOT WAKE UP?: NO

## 2025-04-11 ASSESSMENT — PAIN DESCRIPTION - PAIN TYPE: TYPE: CHRONIC PAIN

## 2025-04-11 ASSESSMENT — PAIN DESCRIPTION - ONSET: ONSET: ONGOING

## 2025-04-11 ASSESSMENT — PAIN - FUNCTIONAL ASSESSMENT: PAIN_FUNCTIONAL_ASSESSMENT: 0-10

## 2025-04-11 ASSESSMENT — PAIN DESCRIPTION - DESCRIPTORS: DESCRIPTORS: ACHING

## 2025-04-11 NOTE — Clinical Note
Skyler Pena was seen and treated in our emergency department on 4/11/2025.  He may return to work on 04/14/2025.       If you have any questions or concerns, please don't hesitate to call.      Elio Mehta, DO

## 2025-04-12 VITALS
HEIGHT: 74 IN | DIASTOLIC BLOOD PRESSURE: 73 MMHG | RESPIRATION RATE: 15 BRPM | WEIGHT: 295 LBS | SYSTOLIC BLOOD PRESSURE: 156 MMHG | HEART RATE: 63 BPM | TEMPERATURE: 97.9 F | OXYGEN SATURATION: 98 % | BODY MASS INDEX: 37.86 KG/M2

## 2025-04-12 LAB
ATRIAL RATE: 62 BPM
ATRIAL RATE: 73 BPM
HOLD SPECIMEN: NORMAL
P AXIS: 39 DEGREES
P AXIS: 43 DEGREES
P OFFSET: 132 MS
P OFFSET: 142 MS
P ONSET: 84 MS
P ONSET: 91 MS
PR INTERVAL: 252 MS
PR INTERVAL: 262 MS
Q ONSET: 215 MS
Q ONSET: 217 MS
QRS COUNT: 10 BEATS
QRS COUNT: 11 BEATS
QRS DURATION: 104 MS
QRS DURATION: 106 MS
QT INTERVAL: 412 MS
QT INTERVAL: 430 MS
QTC CALCULATION(BAZETT): 436 MS
QTC CALCULATION(BAZETT): 453 MS
QTC FREDERICIA: 435 MS
QTC FREDERICIA: 440 MS
R AXIS: -13 DEGREES
R AXIS: -20 DEGREES
T AXIS: 100 DEGREES
T AXIS: 114 DEGREES
T OFFSET: 423 MS
T OFFSET: 430 MS
VENTRICULAR RATE: 62 BPM
VENTRICULAR RATE: 73 BPM

## 2025-04-12 RX ORDER — POTASSIUM CHLORIDE 20 MEQ/1
20 TABLET, EXTENDED RELEASE ORAL DAILY
Qty: 5 TABLET | Refills: 0 | Status: SHIPPED | OUTPATIENT
Start: 2025-04-12 | End: 2025-04-17

## 2025-04-12 ASSESSMENT — PAIN SCALES - GENERAL: PAINLEVEL_OUTOF10: 0 - NO PAIN

## 2025-04-12 NOTE — ED PROVIDER NOTES
HPI   Chief Complaint   Patient presents with    Dizziness     Brought in by EMS from home. Patient got dizzy when he stood up from chair to go to the bedroom and he sat back down. EMS gave 35mcg epi push for HR 50-55. Pt is A&Ox4 and ambulated short distance from rney to bed. Denies CP, Abd pain, N/V, SOB.    Hypotension       The patient is a 69-year-old male with a past medical history of hypothyroidism, elevated PSA, obstructive uropathy, elevated cholesterol, chronic pain of both shoulders, hypertension, bilateral knee pain, elevated BMI presents emergency room with chief complaint of an abrupt onset of near syncope with diaphoresis.  Patient reports earlier this afternoon he was driving to a construction site and while on the way there he felt suddenly very fatigued while driving.  Symptoms lasted about 5 minutes and resolved.  Patient states after completing his business at work he had a couple of drinks and then headed home.  He states he and his wife had dinner and then he sat down after eating.  He still became lightheaded and when he stood up he felt like he was going to pass out.  The patient's wife who was present states what she witnessed is he was asleep in a chair after dinner and suddenly woke up telling her he did not feel right.  When he stood up to go to bed he suddenly became lightheaded lost his balance and had to grab onto a railing to keep from falling.  She noticed he was very pale and diaphoretic and had to sit back down in the chair.  The wife states that for about 10 minutes he had his eyes open but was nonverbal.  Upon arrival by EMS the patient was found to be bradycardic between 50 to 55 bpm and he was slightly hypotensive.  IV was started and he was given 35 mcg of epinephrine IV push.  This seemed to arrival bring him back to baseline.  Upon arrival to the ED the patient states he is feeling much better but still feels kind of lightheaded.  Both the patient and the wife state that  there was no syncope.  Patient denied any chest pain, palpitations, back pain, any recent illnesses such as fever, chills, nausea, vomiting or diarrhea.      History provided by:  Patient, spouse and medical records          Patient History   Past Medical History:   Diagnosis Date    COVID-19 02/02/2022    COVID-19    Disease of thyroid gland     Personal history of other diseases of the nervous system and sense organs 03/01/2022    History of eustachian tube dysfunction    Personal history of other diseases of the respiratory system 12/19/2022    History of sore throat    Syncope 12/23/2013     Past Surgical History:   Procedure Laterality Date    OTHER SURGICAL HISTORY  01/30/2020    No history of surgery     Family History   Problem Relation Name Age of Onset    Breast cancer Mother      Lung cancer Mother       Social History     Tobacco Use    Smoking status: Never    Smokeless tobacco: Never   Vaping Use    Vaping status: Never Used   Substance Use Topics    Alcohol use: Yes     Alcohol/week: 6.0 standard drinks of alcohol     Types: 6 Cans of beer per week    Drug use: Never       Physical Exam   ED Triage Vitals [04/11/25 1922]   Temperature Heart Rate Respirations BP   36.3 °C (97.3 °F) 53 17 111/63      Pulse Ox Temp Source Heart Rate Source Patient Position   94 % Temporal Monitor Sitting      BP Location FiO2 (%)     -- --       Physical Exam  Vitals and nursing note reviewed.   Constitutional:       General: He is awake. He is not in acute distress.     Appearance: Normal appearance. He is well-developed, well-groomed and overweight. He is not ill-appearing, toxic-appearing or diaphoretic.   HENT:      Head: Normocephalic and atraumatic.      Right Ear: Tympanic membrane, ear canal and external ear normal.      Left Ear: Tympanic membrane, ear canal and external ear normal.      Nose: Nose normal.      Mouth/Throat:      Mouth: Mucous membranes are moist.      Pharynx: Oropharynx is clear.   Eyes:       Extraocular Movements: Extraocular movements intact.      Conjunctiva/sclera: Conjunctivae normal.      Pupils: Pupils are equal, round, and reactive to light.   Neck:      Vascular: No carotid bruit.   Cardiovascular:      Rate and Rhythm: Normal rate and regular rhythm.      Pulses: Normal pulses.      Heart sounds: Normal heart sounds.   Pulmonary:      Effort: Pulmonary effort is normal.      Breath sounds: Normal breath sounds. No wheezing, rhonchi or rales.   Abdominal:      General: Bowel sounds are normal. There is no distension.      Palpations: Abdomen is soft. There is no mass.      Tenderness: There is no abdominal tenderness. There is no right CVA tenderness, left CVA tenderness, guarding or rebound.      Hernia: No hernia is present.   Musculoskeletal:         General: No swelling or tenderness. Normal range of motion.      Right shoulder: Normal.      Left shoulder: Normal.      Right upper arm: Normal.      Left upper arm: Normal.      Right elbow: Normal.      Left elbow: Normal.      Right forearm: Normal.      Left forearm: Normal.      Right wrist: Normal.      Left wrist: Normal.      Right hand: Normal.      Left hand: Normal.      Cervical back: Normal, normal range of motion and neck supple. No rigidity.      Thoracic back: Normal.      Lumbar back: Normal.      Right hip: Normal.      Left hip: Normal.      Right upper leg: Normal.      Left upper leg: Normal.      Right knee: Normal.      Left knee: Normal.      Right lower leg: Normal. No tenderness.      Left lower leg: Normal. No tenderness.      Right ankle: Normal.      Right Achilles Tendon: Normal.      Left ankle: Normal.      Left Achilles Tendon: Normal.      Right foot: Normal.      Left foot: Normal.   Lymphadenopathy:      Cervical: No cervical adenopathy.   Skin:     General: Skin is warm and dry.      Capillary Refill: Capillary refill takes less than 2 seconds.      Findings: No rash.   Neurological:      General: No focal  deficit present.      Mental Status: He is alert and oriented to person, place, and time. Mental status is at baseline.      GCS: GCS eye subscore is 4. GCS verbal subscore is 5. GCS motor subscore is 6.      Cranial Nerves: Cranial nerves 2-12 are intact.      Sensory: Sensation is intact.      Motor: Motor function is intact.      Coordination: Coordination is intact.   Psychiatric:         Attention and Perception: Attention and perception normal.         Mood and Affect: Mood and affect normal.         Speech: Speech normal.         Behavior: Behavior normal. Behavior is cooperative.         Thought Content: Thought content normal.         Cognition and Memory: Cognition and memory normal.         Judgment: Judgment normal.           ED Course & MDM   Diagnoses as of 04/11/25 2212   Near syncope   Symptomatic bradycardia   Transient confusion   Hypothyroidism, unspecified type   Hypokalemia                 No data recorded     Emily Coma Scale Score: 15 (04/11/25 1945 : Leesa Hernandez RN)       NIH Stroke Scale: 0 (04/11/25 1945 : Leesa Hernandez RN)                   Medical Decision Making  Temperature 36.3, heart rate 53, respirations 17, blood pressure 111/63, pulse ox is 94% on room air  EKG interpreted by me at 1958 sinus rhythm with first-degree AV block, rate 62, , QRS is 106, QT was 430, QTc was 436 no STEMI. Patient's EKG is unchanged from previous EKG performed back on 11 April 2025 which shows sinus rhythm with first-degree AV block rate 60, , QRS is 104,  QTc was 426  Patient is a 69-year-old male who had a witnessed near syncopal episode with altered mental status by the wife.    Lab work was reviewed CBC reviewed which shows a white count of 5.9, RBCs 4.22, hemoglobin 13.1, hematocrit 39.3, platelet count was 228.  Lymphocytes 0.76.  Comprehensive metabolic panel pertinent positives glucose 107, potassium 3.4.  Patient was given 40 mEq of potassium orally.  Anion gap of 9,  calcium 8.3, total protein 6.1.  GM was 2.16, BNP is 36, troponin is 14, alcohol 78, TSH is 5.94, thyroxine was also elevated at 1.27.  Urinalysis shows 1+ protein negative blood negative nitrites negative leuks no sign of UTI.  Ethanol is 78, D-dimer was elevated at 813.  Portable chest x-ray shows no evidence of acute cardiopulmonary process, noncontrast CT scan of the head was ordered due to his sudden onset of dizziness with near syncope, there was unremarkable no evidence of acute cortical infarct or intracranial hemorrhage, no evidence of any displaced skull fracture.  The sinuses and mastoids showed some mild ethmoid sinus mucosal thickening.  Repeat blood pressure 126/58, heart rate is 60 respirations 18 and he is 94% on room air.  Maintaining his baseline mentation.  Showing normal perfusion.  I have ordered a CT angio of the chest to rule out PE based on his elevated D-dimer.  EKG interpreted by me at 2246 hrs. shows sinus rhythm first-degree AV block with occasional atrial paced complexes T wave normalities in lead I and aVL.  Rate 73, , QRS was 104, QT was 412, QTc was 453 no STEMI  CT into the chest was negative for PE.  Recent BP is 156/73, heart rate 63, respirations 15, pulse ox is 98% on room air    0100 hrs. rounded on the patient to discuss results of her workup.  On repeat exam the patient is back to his baseline.  I did recommend admission for further evaluation.  The patient states that he is feeling better and would prefer to be discharged home.  I did explain to the patient my concerns which includes TIA, cardiac arrhythmia, any potential dysrhythmia that could cause a cardiac event.  After answering all the patient's questions including his wife using shared decision making the patient states he prefer to be discharged home.  Patient was encouraged to return back to the ER sooner should he have any concerns or worsening of any symptoms.  The patient appeared to have the capacity to  understand his decisions.  Patient was advised to call his PCP soon as possible for follow-up appointment.  Will also schedule him for follow-up with cardiology and neurology.  All questions answered prior to discharge        Procedure  Procedures     Jesus Dyson PA-C  04/12/25 0105

## 2025-04-17 ENCOUNTER — OFFICE VISIT (OUTPATIENT)
Dept: PRIMARY CARE | Facility: CLINIC | Age: 70
End: 2025-04-17
Payer: MEDICARE

## 2025-04-17 VITALS
HEART RATE: 60 BPM | OXYGEN SATURATION: 98 % | BODY MASS INDEX: 39.12 KG/M2 | DIASTOLIC BLOOD PRESSURE: 86 MMHG | HEIGHT: 74 IN | RESPIRATION RATE: 16 BRPM | SYSTOLIC BLOOD PRESSURE: 150 MMHG | WEIGHT: 304.8 LBS | TEMPERATURE: 97.9 F

## 2025-04-17 DIAGNOSIS — I10 PRIMARY HYPERTENSION: Primary | ICD-10-CM

## 2025-04-17 DIAGNOSIS — R05.8 PRODUCTIVE COUGH: ICD-10-CM

## 2025-04-17 PROCEDURE — G2211 COMPLEX E/M VISIT ADD ON: HCPCS | Performed by: FAMILY MEDICINE

## 2025-04-17 PROCEDURE — 1036F TOBACCO NON-USER: CPT | Performed by: FAMILY MEDICINE

## 2025-04-17 PROCEDURE — 3077F SYST BP >= 140 MM HG: CPT | Performed by: FAMILY MEDICINE

## 2025-04-17 PROCEDURE — 99213 OFFICE O/P EST LOW 20 MIN: CPT | Performed by: FAMILY MEDICINE

## 2025-04-17 PROCEDURE — 1123F ACP DISCUSS/DSCN MKR DOCD: CPT | Performed by: FAMILY MEDICINE

## 2025-04-17 PROCEDURE — 1159F MED LIST DOCD IN RCRD: CPT | Performed by: FAMILY MEDICINE

## 2025-04-17 PROCEDURE — 1160F RVW MEDS BY RX/DR IN RCRD: CPT | Performed by: FAMILY MEDICINE

## 2025-04-17 PROCEDURE — 3079F DIAST BP 80-89 MM HG: CPT | Performed by: FAMILY MEDICINE

## 2025-04-17 PROCEDURE — 3008F BODY MASS INDEX DOCD: CPT | Performed by: FAMILY MEDICINE

## 2025-04-17 RX ORDER — METHOTREXATE 2.5 MG/1
2.5 TABLET ORAL
COMMUNITY
Start: 2025-04-01

## 2025-04-17 RX ORDER — AZITHROMYCIN 250 MG/1
TABLET, FILM COATED ORAL
Qty: 6 TABLET | Refills: 0 | Status: SHIPPED | OUTPATIENT
Start: 2025-04-17 | End: 2025-04-22

## 2025-04-17 RX ORDER — LOSARTAN POTASSIUM 25 MG/1
25 TABLET ORAL DAILY
Qty: 30 TABLET | Refills: 3
Start: 2025-04-17 | End: 2026-04-12

## 2025-04-17 RX ORDER — HYDROXYCHLOROQUINE SULFATE 200 MG/1
400 TABLET, FILM COATED ORAL DAILY
COMMUNITY
Start: 2025-01-30

## 2025-04-17 RX ORDER — FOLIC ACID 1 MG/1
1 TABLET ORAL DAILY
COMMUNITY
Start: 2025-01-30 | End: 2025-04-30

## 2025-04-17 ASSESSMENT — PATIENT HEALTH QUESTIONNAIRE - PHQ9
SUM OF ALL RESPONSES TO PHQ9 QUESTIONS 1 AND 2: 0
2. FEELING DOWN, DEPRESSED OR HOPELESS: NOT AT ALL
1. LITTLE INTEREST OR PLEASURE IN DOING THINGS: NOT AT ALL

## 2025-04-17 ASSESSMENT — ENCOUNTER SYMPTOMS
LOSS OF SENSATION IN FEET: 0
OCCASIONAL FEELINGS OF UNSTEADINESS: 0
PALPITATIONS: 0
PND: 0
SHORTNESS OF BREATH: 0
HEADACHES: 0
HYPERTENSION: 1
SWEATS: 0
ORTHOPNEA: 0
DEPRESSION: 0
BLURRED VISION: 0
NECK PAIN: 0

## 2025-04-17 NOTE — PROGRESS NOTES
"Subjective   Patient ID:  Skyler Pena is a 69 y.o. male patient who presents today for Hypotension, Near syncope, Symptomatic bradycardia, Abnormal Potassium, and Cough (productive).    Hypotension: Blood pressure is high today.     Near syncope: Patient went to the ER with a near syncope episode with blood pressure 70.50. he felt fatigued and  lost consciousness at home. At the ER he underwent CT scans of brain and chest which were clear. Blood tests revealed low potasium. He notes that on Friday when the episode happened, he was trying to cut down food intake. He had some bourbon and fell asleep and reports waking up feeling off. He felt dizzy when he tried to stand up. Patient is currently on amlodipine 5 mg. Will switch back to losartan.     Symptomatic bradycardia: HR at the ER was around 60. Will continue to monitor HR as it tends to run low around 60. Recommended to drink sufficient amounts of water.     Abnormal potassium: Most recent blood work from a week ago shows a potassium level of 3.4 mmol/L.      Cough: Patient reports a persistent cough that did not resolve with switching out losartan. He feels he may have bronchitis as he is starting to have a more productive cough. He denies any fever or chills. He denies dyspnea with exertion but notes some congestion in the chest.       Objective   Vitals:  /86   Pulse 60   Temp 36.6 °C (97.9 °F)   Resp 16   Ht 1.88 m (6' 2\")   Wt 138 kg (304 lb 12.8 oz)   SpO2 98%   BMI 39.13 kg/m²       Physical Exam  Vitals reviewed.   Constitutional:       Appearance: Normal appearance. He is obese.   Cardiovascular:      Rate and Rhythm: Normal rate and regular rhythm.      Pulses: Normal pulses.      Heart sounds: Normal heart sounds.   Pulmonary:      Effort: Pulmonary effort is normal.      Breath sounds: Normal breath sounds.   Abdominal:      General: Abdomen is flat.      Palpations: Abdomen is soft.   Musculoskeletal:      Cervical back: Normal range " of motion and neck supple.   Neurological:      Mental Status: He is alert.         Labs reviewed from:   4/11/25   CMP, CBC, Lipid      Assessment/Plan   Problem List Items Addressed This Visit       Productive cough    Current Assessment & Plan   This condition is present and symptomatic, will need treatment. Prescribed azithromycin.            Relevant Medications    azithromycin (Zithromax) 250 mg tablet    Primary hypertension - Primary    Current Assessment & Plan    This condition is poorly controlled, therapeutic changes necessary. Will switch back to losartan.            Relevant Medications    losartan (Cozaar) 25 mg tablet    Other Relevant Orders    Follow Up In [facility] - PCP - Nurse Visit       Follow up in:  August 12, 2025 for follow-up of the above issues or sooner if needed with labs prior.       Scribe Attestation  By signing my name below, IMary , Scribe attest that this documentation has been prepared under the direction and in the presence of Fernando Caban MD.  IFernando MD, personally performed the services described in the documentation as scribed by Mary Mcbride in my presence and confirm that it is both accurate and complete.

## 2025-04-17 NOTE — ASSESSMENT & PLAN NOTE
This condition is poorly controlled, therapeutic changes necessary. Will switch back to losartan.

## 2025-04-23 ENCOUNTER — OFFICE VISIT (OUTPATIENT)
Dept: URGENT CARE | Age: 70
End: 2025-04-23
Payer: MEDICARE

## 2025-04-23 VITALS
BODY MASS INDEX: 37.86 KG/M2 | DIASTOLIC BLOOD PRESSURE: 88 MMHG | OXYGEN SATURATION: 96 % | TEMPERATURE: 98 F | WEIGHT: 295 LBS | HEART RATE: 58 BPM | RESPIRATION RATE: 18 BRPM | SYSTOLIC BLOOD PRESSURE: 177 MMHG | HEIGHT: 74 IN

## 2025-04-23 DIAGNOSIS — I10 PRIMARY HYPERTENSION: ICD-10-CM

## 2025-04-23 DIAGNOSIS — H10.33 ACUTE CONJUNCTIVITIS OF BOTH EYES, UNSPECIFIED ACUTE CONJUNCTIVITIS TYPE: Primary | ICD-10-CM

## 2025-04-23 PROCEDURE — 3077F SYST BP >= 140 MM HG: CPT | Performed by: NURSE PRACTITIONER

## 2025-04-23 PROCEDURE — 99203 OFFICE O/P NEW LOW 30 MIN: CPT | Performed by: NURSE PRACTITIONER

## 2025-04-23 PROCEDURE — 3079F DIAST BP 80-89 MM HG: CPT | Performed by: NURSE PRACTITIONER

## 2025-04-23 PROCEDURE — 1123F ACP DISCUSS/DSCN MKR DOCD: CPT | Performed by: NURSE PRACTITIONER

## 2025-04-23 PROCEDURE — 3008F BODY MASS INDEX DOCD: CPT | Performed by: NURSE PRACTITIONER

## 2025-04-23 PROCEDURE — 1160F RVW MEDS BY RX/DR IN RCRD: CPT | Performed by: NURSE PRACTITIONER

## 2025-04-23 PROCEDURE — 1159F MED LIST DOCD IN RCRD: CPT | Performed by: NURSE PRACTITIONER

## 2025-04-23 PROCEDURE — 1036F TOBACCO NON-USER: CPT | Performed by: NURSE PRACTITIONER

## 2025-04-23 RX ORDER — TOBRAMYCIN 3 MG/ML
2 SOLUTION/ DROPS OPHTHALMIC 4 TIMES DAILY
Qty: 5 ML | Refills: 0 | Status: SHIPPED | OUTPATIENT
Start: 2025-04-23 | End: 2025-04-25 | Stop reason: SINTOL

## 2025-04-23 ASSESSMENT — ENCOUNTER SYMPTOMS
FEVER: 0
PHOTOPHOBIA: 0
CARDIOVASCULAR NEGATIVE: 1
EYE ITCHING: 1
GASTROINTESTINAL NEGATIVE: 1
EYE PAIN: 0
EYE DISCHARGE: 1
RESPIRATORY NEGATIVE: 1
CHILLS: 0
EYE REDNESS: 1

## 2025-04-23 NOTE — PROGRESS NOTES
"Subjective   Patient ID: Skyler Pena is a 69 y.o. male. They present today with a chief complaint of Conjunctivitis (C/o burning and discharge x 2 days).    History of Present Illness  HPI    Patient presents for eye issue. Bilateral eyes are red and when he woke up this morning they were both matted shut from drainage. Denies trauma, no foreign object. There is no pain. Vision is unaffected. Denies fever/chills. No other issues or concerns.     Past Medical History  Allergies as of 04/23/2025    (No Known Allergies)       Prescriptions Prior to Admission[1]     Medical History[2]    Surgical History[3]     reports that he has never smoked. He has never used smokeless tobacco. He reports current alcohol use of about 6.0 standard drinks of alcohol per week. He reports that he does not use drugs.    Review of Systems  Review of Systems   Constitutional:  Negative for chills and fever.   HENT: Negative.     Eyes:  Positive for discharge, redness and itching. Negative for photophobia, pain and visual disturbance.   Respiratory: Negative.     Cardiovascular: Negative.    Gastrointestinal: Negative.            Objective    Vitals:    04/23/25 0828   BP: 177/88   Pulse: 58   Resp: 18   Temp: 36.7 °C (98 °F)   TempSrc: Oral   SpO2: 96%   Weight: 134 kg (295 lb)   Height: 1.88 m (6' 2\")     No LMP for male patient.    Physical Exam  Constitutional:       General: He is not in acute distress.     Appearance: Normal appearance. He is not ill-appearing or toxic-appearing.   HENT:      Head: Normocephalic and atraumatic.      Right Ear: Hearing and external ear normal.      Left Ear: Hearing and external ear normal.      Nose: Nose normal.      Mouth/Throat:      Lips: Pink.   Eyes:      General: Lids are normal.         Right eye: Discharge present. No foreign body or hordeolum.         Left eye: Discharge present.No foreign body or hordeolum.      Extraocular Movements: Extraocular movements intact.      Conjunctiva/sclera: "      Right eye: Right conjunctiva is injected. No chemosis, exudate or hemorrhage.     Left eye: Left conjunctiva is injected. No chemosis, exudate or hemorrhage.     Pupils: Pupils are equal, round, and reactive to light.   Cardiovascular:      Rate and Rhythm: Normal rate.   Pulmonary:      Effort: Pulmonary effort is normal. No respiratory distress.   Skin:     General: Skin is warm and dry.   Neurological:      General: No focal deficit present.      Mental Status: He is alert.         Procedures    Point of Care Test & Imaging Results from this visit  No results found for this visit on 04/23/25.   Imaging  No results found.    Cardiology, Vascular, and Other Imaging  No other imaging results found for the past 2 days      Diagnostic study results (if any) were reviewed by LUI Jenkins.    Assessment/Plan   Allergies, medications, history, and pertinent labs/EKGs/Imaging reviewed by LUI Jenkins.     Medical Decision Making  Symptoms consistent with conjunctivitis rx tobramycin. Blood pressure elevated today, patient stated he has had issues getting it regulated, and was recently started on a new medication.  At time of discharge patient was clinically well-appearing and HDS for outpatient management. He was educated regarding diagnosis, supportive care, OTC and Rx medications. He was given the opportunity to ask questions prior to discharge.  They verbalized understanding of my discussion of the plans for treatment, expected course, indications to return to  or seek further evaluation in ED, and the need for timely follow up as directed.         Orders and Diagnoses  Diagnoses and all orders for this visit:  Acute conjunctivitis of both eyes, unspecified acute conjunctivitis type  -     tobramycin (Tobrex) 0.3 % ophthalmic solution; Administer 2 drops into both eyes 4 times a day for 7 days.  Primary hypertension      Medical Admin Record      Patient disposition:  Home    Electronically signed by LUI Jenkins  9:12 AM           [1] (Not in a hospital admission)   [2]   Past Medical History:  Diagnosis Date    COVID-19 02/02/2022    COVID-19    Disease of thyroid gland     Personal history of other diseases of the nervous system and sense organs 03/01/2022    History of eustachian tube dysfunction    Personal history of other diseases of the respiratory system 12/19/2022    History of sore throat    Syncope 12/23/2013   [3]   Past Surgical History:  Procedure Laterality Date    OTHER SURGICAL HISTORY  01/30/2020    No history of surgery

## 2025-04-25 ENCOUNTER — OFFICE VISIT (OUTPATIENT)
Dept: URGENT CARE | Age: 70
End: 2025-04-25
Payer: MEDICARE

## 2025-04-25 VITALS
BODY MASS INDEX: 37.86 KG/M2 | WEIGHT: 295 LBS | HEIGHT: 74 IN | OXYGEN SATURATION: 97 % | TEMPERATURE: 98 F | HEART RATE: 58 BPM | RESPIRATION RATE: 18 BRPM | DIASTOLIC BLOOD PRESSURE: 85 MMHG | SYSTOLIC BLOOD PRESSURE: 162 MMHG

## 2025-04-25 DIAGNOSIS — H57.89 EYE SWELLING, BILATERAL: Primary | ICD-10-CM

## 2025-04-25 DIAGNOSIS — H10.33 ACUTE CONJUNCTIVITIS OF BOTH EYES, UNSPECIFIED ACUTE CONJUNCTIVITIS TYPE: ICD-10-CM

## 2025-04-25 DIAGNOSIS — T78.40XA ALLERGIC REACTION TO DRUG, INITIAL ENCOUNTER: ICD-10-CM

## 2025-04-25 RX ORDER — PREDNISONE 20 MG/1
TABLET ORAL
Qty: 30 TABLET | Refills: 0 | Status: SHIPPED | OUTPATIENT
Start: 2025-04-25 | End: 2025-05-10

## 2025-04-25 RX ORDER — CETIRIZINE HYDROCHLORIDE 10 MG/1
10 TABLET ORAL DAILY
Qty: 30 TABLET | Refills: 11 | Status: SHIPPED | OUTPATIENT
Start: 2025-04-25 | End: 2026-04-25

## 2025-04-25 RX ORDER — OFLOXACIN 3 MG/ML
1 SOLUTION/ DROPS OPHTHALMIC 4 TIMES DAILY
Qty: 5 ML | Refills: 0 | Status: SHIPPED | OUTPATIENT
Start: 2025-04-25 | End: 2025-05-05

## 2025-04-25 ASSESSMENT — ENCOUNTER SYMPTOMS
ALLERGIC/IMMUNOLOGIC NEGATIVE: 1
PSYCHIATRIC NEGATIVE: 1
CONSTITUTIONAL NEGATIVE: 1
EYE REDNESS: 1
EYE ITCHING: 1
ENDOCRINE NEGATIVE: 1
HEMATOLOGIC/LYMPHATIC NEGATIVE: 1
RESPIRATORY NEGATIVE: 1
PALPITATIONS: 0
NEUROLOGICAL NEGATIVE: 1
GASTROINTESTINAL NEGATIVE: 1
MUSCULOSKELETAL NEGATIVE: 1

## 2025-04-25 NOTE — PROGRESS NOTES
Subjective   Patient ID: Skyler Pena is a 69 y.o. male. They present today with a chief complaint of Conjunctivitis (Pt seen 2 days ago, pink eye is not better yet. Eyes a LOT more swollen).    History of Present Illness    Conjunctivitis  Associated symptoms: no chest pain        Pt presents to urgent care with c/o   bilateral eye swelling x 1 day.  Patient reports he was seen in this urgent care 2 days ago and was diagnosed with bilateral conjunctivitis.  Reports he has been using tobramycin eyedrops with no relief of symptoms.  Reports he woke up this morning and bilateral eyes were crusted shut and very swollen.  He denies visual changes.  Denies pain.  Reports bilateral eyes are very itchy .  Pt denies CP, SOB, palpitations, fevers, abd pain, n/v/d, sick contacts, recent travel.        Past Medical History  Allergies as of 04/25/2025 - Reviewed 04/25/2025   Allergen Reaction Noted    Tobramycin Swelling 04/25/2025       Prescriptions Prior to Admission[1]     Medical History[2]    Surgical History[3]     reports that he has never smoked. He has never used smokeless tobacco. He reports current alcohol use of about 6.0 standard drinks of alcohol per week. He reports that he does not use drugs.    Review of Systems  Review of Systems   Constitutional: Negative.    HENT: Negative.     Eyes:  Positive for redness and itching.   Respiratory: Negative.     Cardiovascular:  Negative for chest pain and palpitations.   Gastrointestinal: Negative.    Endocrine: Negative.    Genitourinary: Negative.    Musculoskeletal: Negative.    Skin: Negative.    Allergic/Immunologic: Negative.    Neurological: Negative.    Hematological: Negative.    Psychiatric/Behavioral: Negative.     All other systems reviewed and are negative.                                 Objective    Vitals:    04/25/25 0926 04/25/25 0932   BP: (!) 181/91 162/85   Pulse: 68 58   Resp: 18 18   Temp: 36.7 °C (98 °F)    TempSrc: Temporal    SpO2: 97% 97%  "  Weight: 134 kg (295 lb)    Height: 1.88 m (6' 2\")      No LMP for male patient.    Physical Exam  Vitals and nursing note reviewed.   Constitutional:       General: He is not in acute distress.     Appearance: Normal appearance. He is not ill-appearing or toxic-appearing.   HENT:      Head: Atraumatic.      Mouth/Throat:      Mouth: Mucous membranes are moist.      Pharynx: Oropharynx is clear.   Eyes:      Extraocular Movements: Extraocular movements intact.      Conjunctiva/sclera: Conjunctivae normal.      Pupils: Pupils are equal, round, and reactive to light.   Cardiovascular:      Rate and Rhythm: Normal rate.   Pulmonary:      Effort: Pulmonary effort is normal.   Skin:     General: Skin is warm and dry.   Neurological:      General: No focal deficit present.      Mental Status: He is alert and oriented to person, place, and time.   Psychiatric:         Mood and Affect: Mood normal.         Behavior: Behavior normal.         Thought Content: Thought content normal.         Procedures    Point of Care Test & Imaging Results from this visit  No results found for this visit on 04/25/25.   Imaging  No results found.    Cardiology, Vascular, and Other Imaging  No other imaging results found for the past 2 days      Diagnostic study results (if any) were reviewed by LUI Allen.    Assessment/Plan   Allergies, medications, history, and pertinent labs/EKGs/Imaging reviewed by LUI Allen.     Medical Decision Making    Patient presents with swelling under bilateral eyes.  Reports bilateral eyes were crusted shut with discharge this morning.  Patient reports he has been using tobramycin eyedrops with no relief.  Reports that swelling is actually gotten worse in the past 1 to 2 days.  Concerned that patient may be allergic to eyedrops.  Will change to prescription ofloxacin eyedrops.  Will also add Zyrtec and prednisone for eye swelling as patient does report he gets seasonal allergies " and I believe this may be contributing to his symptoms. PERRLA.   He denies visual changes.At time of discharge patient was clinically well-appearing and HDS for outpatient management. The patient was educated regarding diagnosis, supportive care, OTC and Rx medications. The patient was given the opportunity to ask questions prior to discharge.  They verbalized understanding of my discussion of the plans for treatment, expected course, indications to return to  or seek further evaluation in ED, and the need for timely follow up as directed.   They were provided with a work/school excuse if requested.       Orders and Diagnoses  Diagnoses and all orders for this visit:  Eye swelling, bilateral  -     cetirizine (ZyrTEC) 10 mg tablet; Take 1 tablet (10 mg) by mouth once daily.  -     predniSONE (Deltasone) 20 mg tablet; Take 1 tablet (20 mg) by mouth 3 times a day for 5 days, THEN 1 tablet (20 mg) 2 times a day for 5 days, THEN 1 tablet (20 mg) once daily for 5 days.  Allergic reaction to drug, initial encounter  Acute conjunctivitis of both eyes, unspecified acute conjunctivitis type  -     ofloxacin (Ocuflox) 0.3 % ophthalmic solution; Administer 1 drop into both eyes 4 times a day for 10 days.  -     dextran 70-hypromellose (Bion Tears) 0.1-0.3 % ophthalmic solution; Administer 2 drops into both eyes 3 times a day as needed for dry eyes.      Medical Admin Record      Patient disposition: Home    Electronically signed by LUI Allen  11:18 AM           [1] (Not in a hospital admission)   [2]   Past Medical History:  Diagnosis Date    COVID-19 02/02/2022    COVID-19    Disease of thyroid gland     Personal history of other diseases of the nervous system and sense organs 03/01/2022    History of eustachian tube dysfunction    Personal history of other diseases of the respiratory system 12/19/2022    History of sore throat    Syncope 12/23/2013   [3]   Past Surgical History:  Procedure Laterality Date     OTHER SURGICAL HISTORY  01/30/2020    No history of surgery

## 2025-04-26 ENCOUNTER — DOCUMENTATION (OUTPATIENT)
Dept: PRIMARY CARE | Facility: CLINIC | Age: 70
End: 2025-04-26
Payer: MEDICARE

## 2025-04-26 NOTE — PROGRESS NOTES
RESPONDED TO ON CALL MESSAGE ([facility] VIA [employer]): I called and I talked to patient's wife.    Per wife:  Patient is with pink eye. [facility] [place] [facility] and was given tobramycin. He has given tobramycin. The symptoms got worse. They went back to same clinic April 25. He was shifted to ofloxacin (Ocufox) and was prescribed prednisone tapered dose, Zyrtec and Bion tears.    Wife states patient's skin surrounding the eye is swollen. The eye irritation is getting better. She is asking if they should start giving the prednisone.     I recommended that they should start giving prednisone. Call back if there is untoward reaction or if there are any questions.     I advised them to call Dr Caban office on Monday to update him and also to inquire if there any other instructions    Patient's wife verbalized understanding.

## 2025-05-01 ENCOUNTER — APPOINTMENT (OUTPATIENT)
Dept: PRIMARY CARE | Facility: CLINIC | Age: 70
End: 2025-05-01
Payer: MEDICARE

## 2025-05-01 DIAGNOSIS — I10 PRIMARY HYPERTENSION: ICD-10-CM

## 2025-05-01 PROCEDURE — 93790 AMBL BP MNTR W/SW I&R: CPT | Performed by: FAMILY MEDICINE

## 2025-05-01 NOTE — PROGRESS NOTES
Subjective   Patient ID: Skyler Pena is a 69 y.o. male who presents for Blood Pressure Check.         HPI     Review of Systems    Objective   There were no vitals taken for this visit.    Physical Exam    Assessment/Plan

## 2025-05-07 ENCOUNTER — OFFICE VISIT (OUTPATIENT)
Dept: URGENT CARE | Age: 70
End: 2025-05-07
Payer: MEDICARE

## 2025-05-07 VITALS
OXYGEN SATURATION: 96 % | WEIGHT: 295 LBS | DIASTOLIC BLOOD PRESSURE: 75 MMHG | BODY MASS INDEX: 37.86 KG/M2 | HEART RATE: 59 BPM | TEMPERATURE: 97.9 F | HEIGHT: 74 IN | SYSTOLIC BLOOD PRESSURE: 154 MMHG | RESPIRATION RATE: 20 BRPM

## 2025-05-07 DIAGNOSIS — J01.90 ACUTE SINUSITIS, RECURRENCE NOT SPECIFIED, UNSPECIFIED LOCATION: Primary | ICD-10-CM

## 2025-05-07 LAB
POC HUMAN RHINOVIRUS PCR: NEGATIVE
POC INFLUENZA A VIRUS PCR: NEGATIVE
POC INFLUENZA B VIRUS PCR: NEGATIVE
POC RESPIRATORY SYNCYTIAL VIRUS PCR: NEGATIVE
POC STREPTOCOCCUS PYOGENES (GROUP A STREP) PCR: NEGATIVE

## 2025-05-07 PROCEDURE — 3077F SYST BP >= 140 MM HG: CPT | Performed by: NURSE PRACTITIONER

## 2025-05-07 PROCEDURE — 3078F DIAST BP <80 MM HG: CPT | Performed by: NURSE PRACTITIONER

## 2025-05-07 PROCEDURE — 1159F MED LIST DOCD IN RCRD: CPT | Performed by: NURSE PRACTITIONER

## 2025-05-07 PROCEDURE — 1160F RVW MEDS BY RX/DR IN RCRD: CPT | Performed by: NURSE PRACTITIONER

## 2025-05-07 PROCEDURE — 99213 OFFICE O/P EST LOW 20 MIN: CPT | Performed by: NURSE PRACTITIONER

## 2025-05-07 PROCEDURE — 3008F BODY MASS INDEX DOCD: CPT | Performed by: NURSE PRACTITIONER

## 2025-05-07 PROCEDURE — 87651 STREP A DNA AMP PROBE: CPT | Performed by: NURSE PRACTITIONER

## 2025-05-07 PROCEDURE — 1036F TOBACCO NON-USER: CPT | Performed by: NURSE PRACTITIONER

## 2025-05-07 PROCEDURE — 87631 RESP VIRUS 3-5 TARGETS: CPT | Performed by: NURSE PRACTITIONER

## 2025-05-07 RX ORDER — BENZONATATE 200 MG/1
200 CAPSULE ORAL 3 TIMES DAILY PRN
Qty: 21 CAPSULE | Refills: 0 | Status: SHIPPED | OUTPATIENT
Start: 2025-05-07 | End: 2025-05-14

## 2025-05-07 RX ORDER — CEFDINIR 300 MG/1
300 CAPSULE ORAL 2 TIMES DAILY
Qty: 14 CAPSULE | Refills: 0 | Status: SHIPPED | OUTPATIENT
Start: 2025-05-07 | End: 2025-05-14

## 2025-05-07 ASSESSMENT — ENCOUNTER SYMPTOMS
RHINORRHEA: 1
WHEEZING: 0
SINUS PRESSURE: 1
FEVER: 0
CHILLS: 0
COUGH: 1
SINUS PAIN: 1
SHORTNESS OF BREATH: 0
SORE THROAT: 1

## 2025-05-07 NOTE — PROGRESS NOTES
"Subjective   Patient ID: Skyler Pena is a 69 y.o. male. They present today with a chief complaint of Cough, Sore Throat, Nasal Congestion, and Earache (Cough with mucus, sinus congestion, runny nose, bilateral ear pain, sore throat ).    History of Present Illness    Patient presents for URI symptoms that started over the weekend. Reporting congestion, sore throat, bilateral earache, sinus pain and pressure, runny nose, productive cough. No CP, SOB, N/V/D. He has tried OTC with no relief.     Cough  Associated symptoms include ear pain, postnasal drip, rhinorrhea and a sore throat. Pertinent negatives include no chills, fever, shortness of breath or wheezing.   Sore Throat   Associated symptoms include congestion, coughing and ear pain. Pertinent negatives include no shortness of breath.   Earache   Associated symptoms include coughing, rhinorrhea and a sore throat.         Past Medical History  Allergies as of 05/07/2025 - Reviewed 05/07/2025   Allergen Reaction Noted    Tobramycin Swelling 04/25/2025       Prescriptions Prior to Admission[1]     Medical History[2]    Surgical History[3]     reports that he has never smoked. He has never used smokeless tobacco. He reports current alcohol use of about 6.0 standard drinks of alcohol per week. He reports that he does not use drugs.    Review of Systems  Review of Systems   Constitutional:  Negative for chills and fever.   HENT:  Positive for congestion, ear pain, postnasal drip, rhinorrhea, sinus pressure, sinus pain and sore throat.    Respiratory:  Positive for cough. Negative for shortness of breath and wheezing.            Objective    Vitals:    05/07/25 1116   BP: 154/75   BP Location: Left arm   Patient Position: Sitting   Pulse: 59   Resp: 20   Temp: 36.6 °C (97.9 °F)   SpO2: 96%   Weight: 134 kg (295 lb)   Height: 1.88 m (6' 2\")     No LMP for male patient.    Physical Exam  Constitutional:       General: He is not in acute distress.     Appearance: Normal " appearance. He is not ill-appearing or toxic-appearing.   HENT:      Head: Normocephalic and atraumatic.      Right Ear: A middle ear effusion is present. Tympanic membrane is not erythematous or bulging.      Left Ear: A middle ear effusion is present. Tympanic membrane is not erythematous or bulging.      Nose:      Right Sinus: Maxillary sinus tenderness and frontal sinus tenderness present.      Left Sinus: Maxillary sinus tenderness and frontal sinus tenderness present.      Mouth/Throat:      Pharynx: Posterior oropharyngeal erythema and postnasal drip present. No pharyngeal swelling or oropharyngeal exudate.   Eyes:      General: Lids are normal.   Cardiovascular:      Rate and Rhythm: Normal rate and regular rhythm.      Heart sounds: Normal heart sounds.   Pulmonary:      Effort: Pulmonary effort is normal. No respiratory distress.      Breath sounds: Normal breath sounds.   Skin:     General: Skin is warm and dry.   Neurological:      General: No focal deficit present.      Mental Status: He is alert and oriented to person, place, and time.   Psychiatric:         Attention and Perception: Attention normal.         Mood and Affect: Mood normal.         Speech: Speech normal.         Behavior: Behavior normal.         Thought Content: Thought content normal.         Procedures    Point of Care Test & Imaging Results from this visit  Results for orders placed or performed in visit on 05/07/25   POCT SPOTFIRE R/ST Panel Mini w/Strep A ([facility]) manually resulted   Result Value Ref Range    POC Group A Strep, PCR Negative Negative    POC Respiratory Syncytial Virus PCR Negative Negative    POC Influenza A Virus PCR Negative Negative    POC Influenza B Virus PCR Negative Negative    POC Human Rhinovirus PCR Negative Negative      Imaging  No results found.    Cardiology, Vascular, and Other Imaging  No other imaging results found for the past 2 days      Diagnostic study results (if any) were reviewed by  LUI Jenkins.    Assessment/Plan   Allergies, medications, history, and pertinent labs/EKGs/Imaging reviewed by LUI Jenkins.     Medical Decision Making  Symptoms consistent with sinusitis, rx for omnicef, tessalon.  At time of discharge patient was clinically well-appearing and HDS for outpatient management. He was educated regarding diagnosis, supportive care, OTC and Rx medications. He was given the opportunity to ask questions prior to discharge.  They verbalized understanding of my discussion of the plans for treatment, expected course, indications to return to  or seek further evaluation in ED, and the need for timely follow up as directed.       Orders and Diagnoses  Diagnoses and all orders for this visit:  Acute sinusitis, recurrence not specified, unspecified location  -     POCT SPOTFIRE R/ST Panel Mini w/Strep A ([facility]) manually resulted  -     benzonatate (Tessalon) 200 mg capsule; Take 1 capsule (200 mg) by mouth 3 times a day as needed for cough for up to 7 days. Do not crush or chew.  -     cefdinir (Omnicef) 300 mg capsule; Take 1 capsule (300 mg) by mouth 2 times a day for 7 days.      Medical Admin Record      Patient disposition: Home    Electronically signed by LUI Jenkins  12:30 PM           [1] (Not in a hospital admission)   [2]   Past Medical History:  Diagnosis Date    COVID-19 02/02/2022    COVID-19    Disease of thyroid gland     Personal history of other diseases of the nervous system and sense organs 03/01/2022    History of eustachian tube dysfunction    Personal history of other diseases of the respiratory system 12/19/2022    History of sore throat    Syncope 12/23/2013   [3]   Past Surgical History:  Procedure Laterality Date    OTHER SURGICAL HISTORY  01/30/2020    No history of surgery

## 2025-07-01 DIAGNOSIS — I10 PRIMARY HYPERTENSION: Primary | ICD-10-CM

## 2025-07-01 NOTE — TELEPHONE ENCOUNTER
Rx Refill Request Telephone Encounter    Name:  Skyler Pena  :  [id number]  Medication Name:  AMLODIPINE 5MG  Specific Pharmacy location:  [facility] [place] [facility]  Date of last appointment:  25  Date of next appointment:  25  Best number to reach patient:  [phone]

## 2025-07-02 RX ORDER — AMLODIPINE BESYLATE 5 MG/1
5 TABLET ORAL DAILY
Qty: 90 TABLET | Refills: 3 | OUTPATIENT
Start: 2025-07-02

## 2025-07-03 RX ORDER — AMLODIPINE BESYLATE 5 MG/1
5 TABLET ORAL DAILY
Qty: 30 TABLET | Refills: 0 | Status: SHIPPED | OUTPATIENT
Start: 2025-07-03 | End: 2025-08-02

## 2025-07-17 DIAGNOSIS — I10 PRIMARY HYPERTENSION: ICD-10-CM

## 2025-07-28 DIAGNOSIS — I10 PRIMARY HYPERTENSION: Primary | ICD-10-CM

## 2025-07-28 NOTE — TELEPHONE ENCOUNTER
Rx Refill Request Telephone Encounter    Name:  Skyler Pena  :  [id number]  Medication Name:  AMLODIPINE 5MG   Specific Pharmacy location:  [facility]   Date of last appointment:  2025  Date of next appointment:  2025  Best number to reach patient:  [phone]           Leaving  for [place]

## 2025-07-28 NOTE — TELEPHONE ENCOUNTER
Rx Refill Request Telephone Encounter    Name:  Skyler Pena  :  [id number]  Medication Name:  AMLODIPINE 5MG    Last fill: 2025 30 day supply Q 30 R 0  Specific Pharmacy location:  [facility]   Date of last appointment:  2025  Date of next appointment:  2025  Best number to reach patient:  [phone]           Leaving  for [place]

## 2025-07-29 RX ORDER — AMLODIPINE BESYLATE 5 MG/1
5 TABLET ORAL DAILY
Qty: 30 TABLET | Refills: 5 | Status: SHIPPED | OUTPATIENT
Start: 2025-07-29 | End: 2026-01-25

## 2025-08-12 ENCOUNTER — APPOINTMENT (OUTPATIENT)
Dept: PRIMARY CARE | Facility: CLINIC | Age: 70
End: 2025-08-12
Payer: MEDICARE

## 2025-08-14 ENCOUNTER — HOSPITAL ENCOUNTER (OUTPATIENT)
Dept: RADIOLOGY | Facility: EXTERNAL LOCATION | Age: 70
Discharge: HOME | End: 2025-08-14

## 2025-08-14 ENCOUNTER — OFFICE VISIT (OUTPATIENT)
Dept: ORTHOPEDIC SURGERY | Facility: CLINIC | Age: 70
End: 2025-08-14
Payer: MEDICARE

## 2025-08-14 DIAGNOSIS — M17.0 OSTEOARTHRITIS OF BOTH KNEES, UNSPECIFIED OSTEOARTHRITIS TYPE: ICD-10-CM

## 2025-08-14 PROCEDURE — 99213 OFFICE O/P EST LOW 20 MIN: CPT | Performed by: STUDENT IN AN ORGANIZED HEALTH CARE EDUCATION/TRAINING PROGRAM

## 2025-08-14 PROCEDURE — 20610 DRAIN/INJ JOINT/BURSA W/O US: CPT | Performed by: STUDENT IN AN ORGANIZED HEALTH CARE EDUCATION/TRAINING PROGRAM

## 2025-08-14 RX ORDER — LIDOCAINE HYDROCHLORIDE 10 MG/ML
6 INJECTION, SOLUTION INFILTRATION; PERINEURAL
Status: COMPLETED | OUTPATIENT
Start: 2025-08-14 | End: 2025-08-14

## 2025-08-14 RX ORDER — BETAMETHASONE SODIUM PHOSPHATE AND BETAMETHASONE ACETATE 3; 3 MG/ML; MG/ML
12 INJECTION, SUSPENSION INTRA-ARTICULAR; INTRALESIONAL; INTRAMUSCULAR; SOFT TISSUE
Status: COMPLETED | OUTPATIENT
Start: 2025-08-14 | End: 2025-08-14

## 2025-08-14 RX ADMIN — LIDOCAINE HYDROCHLORIDE 6 ML: 10 INJECTION, SOLUTION INFILTRATION; PERINEURAL at 11:41

## 2025-08-14 RX ADMIN — BETAMETHASONE SODIUM PHOSPHATE AND BETAMETHASONE ACETATE 12 MG: 3; 3 INJECTION, SUSPENSION INTRA-ARTICULAR; INTRALESIONAL; INTRAMUSCULAR; SOFT TISSUE at 11:41

## 2025-09-23 ENCOUNTER — APPOINTMENT (OUTPATIENT)
Dept: PRIMARY CARE | Facility: CLINIC | Age: 70
End: 2025-09-23
Payer: MEDICARE

## 2025-09-25 ENCOUNTER — APPOINTMENT (OUTPATIENT)
Dept: ORTHOPEDIC SURGERY | Facility: CLINIC | Age: 70
End: 2025-09-25
Payer: MEDICARE